# Patient Record
Sex: FEMALE | Race: OTHER | NOT HISPANIC OR LATINO | URBAN - METROPOLITAN AREA
[De-identification: names, ages, dates, MRNs, and addresses within clinical notes are randomized per-mention and may not be internally consistent; named-entity substitution may affect disease eponyms.]

---

## 2017-01-25 ENCOUNTER — OUTPATIENT (OUTPATIENT)
Dept: OUTPATIENT SERVICES | Facility: HOSPITAL | Age: 52
LOS: 1 days | Discharge: HOME | End: 2017-01-25

## 2017-01-25 DIAGNOSIS — C92.11 CHRONIC MYELOID LEUKEMIA, BCR/ABL-POSITIVE, IN REMISSION: ICD-10-CM

## 2017-01-26 ENCOUNTER — APPOINTMENT (OUTPATIENT)
Dept: HEMATOLOGY ONCOLOGY | Facility: CLINIC | Age: 52
End: 2017-01-26

## 2017-01-26 VITALS
HEART RATE: 115 BPM | RESPIRATION RATE: 16 BRPM | OXYGEN SATURATION: 97 % | TEMPERATURE: 98.3 F | BODY MASS INDEX: 23.24 KG/M2 | WEIGHT: 134.48 LBS | SYSTOLIC BLOOD PRESSURE: 140 MMHG | DIASTOLIC BLOOD PRESSURE: 96 MMHG

## 2017-03-01 ENCOUNTER — RX RENEWAL (OUTPATIENT)
Age: 52
End: 2017-03-01

## 2017-04-07 ENCOUNTER — RX RENEWAL (OUTPATIENT)
Age: 52
End: 2017-04-07

## 2017-04-24 ENCOUNTER — OUTPATIENT (OUTPATIENT)
Dept: OUTPATIENT SERVICES | Facility: HOSPITAL | Age: 52
LOS: 1 days | Discharge: HOME | End: 2017-04-24

## 2017-04-24 DIAGNOSIS — C92.11 CHRONIC MYELOID LEUKEMIA, BCR/ABL-POSITIVE, IN REMISSION: ICD-10-CM

## 2017-04-24 DIAGNOSIS — T86.00 UNSPECIFIED COMPLICATION OF BONE MARROW TRANSPLANT: ICD-10-CM

## 2017-04-24 DIAGNOSIS — D89.813 GRAFT-VERSUS-HOST DISEASE, UNSPECIFIED: ICD-10-CM

## 2017-04-27 ENCOUNTER — APPOINTMENT (OUTPATIENT)
Dept: HEMATOLOGY ONCOLOGY | Facility: CLINIC | Age: 52
End: 2017-04-27

## 2017-04-27 VITALS
BODY MASS INDEX: 22.1 KG/M2 | HEART RATE: 116 BPM | WEIGHT: 127.87 LBS | DIASTOLIC BLOOD PRESSURE: 72 MMHG | OXYGEN SATURATION: 96 % | TEMPERATURE: 98 F | SYSTOLIC BLOOD PRESSURE: 122 MMHG | RESPIRATION RATE: 16 BRPM

## 2017-04-27 DIAGNOSIS — R06.2 WHEEZING: ICD-10-CM

## 2017-05-10 ENCOUNTER — RX RENEWAL (OUTPATIENT)
Age: 52
End: 2017-05-10

## 2017-06-19 ENCOUNTER — RX RENEWAL (OUTPATIENT)
Age: 52
End: 2017-06-19

## 2017-07-14 ENCOUNTER — MEDICATION RENEWAL (OUTPATIENT)
Age: 52
End: 2017-07-14

## 2017-07-24 ENCOUNTER — RX RENEWAL (OUTPATIENT)
Age: 52
End: 2017-07-24

## 2017-08-23 ENCOUNTER — CHART COPY (OUTPATIENT)
Age: 52
End: 2017-08-23

## 2017-08-28 ENCOUNTER — OUTPATIENT (OUTPATIENT)
Dept: OUTPATIENT SERVICES | Facility: HOSPITAL | Age: 52
LOS: 1 days | Discharge: HOME | End: 2017-08-28

## 2017-08-28 DIAGNOSIS — C92.11 CHRONIC MYELOID LEUKEMIA, BCR/ABL-POSITIVE, IN REMISSION: ICD-10-CM

## 2017-09-11 ENCOUNTER — APPOINTMENT (OUTPATIENT)
Dept: HEMATOLOGY ONCOLOGY | Facility: CLINIC | Age: 52
End: 2017-09-11
Payer: COMMERCIAL

## 2017-09-21 ENCOUNTER — RESULT REVIEW (OUTPATIENT)
Age: 52
End: 2017-09-21

## 2017-09-21 ENCOUNTER — OUTPATIENT (OUTPATIENT)
Dept: OUTPATIENT SERVICES | Facility: HOSPITAL | Age: 52
LOS: 1 days | End: 2017-09-21
Payer: COMMERCIAL

## 2017-09-21 ENCOUNTER — LABORATORY RESULT (OUTPATIENT)
Age: 52
End: 2017-09-21

## 2017-09-21 ENCOUNTER — APPOINTMENT (OUTPATIENT)
Dept: HEMATOLOGY ONCOLOGY | Facility: CLINIC | Age: 52
End: 2017-09-21
Payer: COMMERCIAL

## 2017-09-21 VITALS
OXYGEN SATURATION: 99 % | HEART RATE: 99 BPM | WEIGHT: 129.63 LBS | TEMPERATURE: 98.8 F | BODY MASS INDEX: 22.97 KG/M2 | DIASTOLIC BLOOD PRESSURE: 86 MMHG | SYSTOLIC BLOOD PRESSURE: 120 MMHG | RESPIRATION RATE: 16 BRPM | HEIGHT: 62.8 IN

## 2017-09-21 DIAGNOSIS — D89.811 CHRONIC GRAFT-VERSUS-HOST DISEASE: ICD-10-CM

## 2017-09-21 LAB
BASOPHILS # BLD AUTO: 0 K/UL — SIGNIFICANT CHANGE UP (ref 0–0.2)
BASOPHILS NFR BLD AUTO: 0.3 % — SIGNIFICANT CHANGE UP (ref 0–2)
EOSINOPHIL # BLD AUTO: 0.1 K/UL — SIGNIFICANT CHANGE UP (ref 0–0.5)
EOSINOPHIL NFR BLD AUTO: 1.1 % — SIGNIFICANT CHANGE UP (ref 0–6)
HCT VFR BLD CALC: 33.6 % — LOW (ref 34.5–45)
HGB BLD-MCNC: 11.4 G/DL — LOW (ref 11.5–15.5)
LYMPHOCYTES # BLD AUTO: 3.2 K/UL — SIGNIFICANT CHANGE UP (ref 1–3.3)
LYMPHOCYTES # BLD AUTO: 52.9 % — HIGH (ref 13–44)
MCHC RBC-ENTMCNC: 31.5 PG — SIGNIFICANT CHANGE UP (ref 27–34)
MCHC RBC-ENTMCNC: 34.1 G/DL — SIGNIFICANT CHANGE UP (ref 32–36)
MCV RBC AUTO: 92.5 FL — SIGNIFICANT CHANGE UP (ref 80–100)
MONOCYTES # BLD AUTO: 0.4 K/UL — SIGNIFICANT CHANGE UP (ref 0–0.9)
MONOCYTES NFR BLD AUTO: 7.3 % — SIGNIFICANT CHANGE UP (ref 2–14)
NEUTROPHILS # BLD AUTO: 2.4 K/UL — SIGNIFICANT CHANGE UP (ref 1.8–7.4)
NEUTROPHILS NFR BLD AUTO: 38.4 % — LOW (ref 43–77)
PLATELET # BLD AUTO: 217 K/UL — SIGNIFICANT CHANGE UP (ref 150–400)
RBC # BLD: 3.63 M/UL — LOW (ref 3.8–5.2)
RBC # FLD: 13.7 % — SIGNIFICANT CHANGE UP (ref 10.3–14.5)
WBC # BLD: 6.1 K/UL — SIGNIFICANT CHANGE UP (ref 3.8–10.5)
WBC # FLD AUTO: 6.1 K/UL — SIGNIFICANT CHANGE UP (ref 3.8–10.5)

## 2017-09-21 PROCEDURE — 81206 BCR/ABL1 GENE MAJOR BP: CPT

## 2017-09-21 PROCEDURE — 88271 CYTOGENETICS DNA PROBE: CPT

## 2017-09-21 PROCEDURE — G0452: CPT | Mod: 26

## 2017-09-21 PROCEDURE — 88291 CYTO/MOLECULAR REPORT: CPT | Mod: 59

## 2017-09-21 PROCEDURE — 88275 CYTOGENETICS 100-300: CPT

## 2017-09-21 PROCEDURE — 99214 OFFICE O/P EST MOD 30 MIN: CPT

## 2017-09-21 PROCEDURE — 88237 TISSUE CULTURE BONE MARROW: CPT

## 2017-09-22 ENCOUNTER — APPOINTMENT (OUTPATIENT)
Dept: HEMATOLOGY ONCOLOGY | Facility: CLINIC | Age: 52
End: 2017-09-22
Payer: COMMERCIAL

## 2017-09-22 VITALS
WEIGHT: 127.87 LBS | DIASTOLIC BLOOD PRESSURE: 85 MMHG | TEMPERATURE: 97.8 F | HEART RATE: 110 BPM | OXYGEN SATURATION: 98 % | BODY MASS INDEX: 22.8 KG/M2 | SYSTOLIC BLOOD PRESSURE: 121 MMHG | RESPIRATION RATE: 16 BRPM

## 2017-09-22 PROCEDURE — 99215 OFFICE O/P EST HI 40 MIN: CPT

## 2017-09-25 LAB — BCR/ABL BY RT - PCR QUANTITATIVE: SIGNIFICANT CHANGE UP

## 2017-09-26 ENCOUNTER — RX RENEWAL (OUTPATIENT)
Age: 52
End: 2017-09-26

## 2017-09-26 LAB — CHROM ANALY INTERPHASE BLD FISH-IMP: SIGNIFICANT CHANGE UP

## 2017-09-27 ENCOUNTER — RX RENEWAL (OUTPATIENT)
Age: 52
End: 2017-09-27

## 2017-09-27 ENCOUNTER — MEDICATION RENEWAL (OUTPATIENT)
Age: 52
End: 2017-09-27

## 2017-09-27 RX ORDER — OXYCODONE HYDROCHLORIDE 30 MG/1
30 TABLET ORAL
Qty: 300 | Refills: 0 | Status: DISCONTINUED | COMMUNITY
Start: 2017-07-24 | End: 2017-09-27

## 2017-09-28 ENCOUNTER — RX RENEWAL (OUTPATIENT)
Age: 52
End: 2017-09-28

## 2017-11-07 ENCOUNTER — RX RENEWAL (OUTPATIENT)
Age: 52
End: 2017-11-07

## 2017-11-15 ENCOUNTER — OUTPATIENT (OUTPATIENT)
Dept: OUTPATIENT SERVICES | Facility: HOSPITAL | Age: 52
LOS: 1 days | Discharge: HOME | End: 2017-11-15

## 2017-11-15 DIAGNOSIS — C92.11 CHRONIC MYELOID LEUKEMIA, BCR/ABL-POSITIVE, IN REMISSION: ICD-10-CM

## 2017-11-20 ENCOUNTER — APPOINTMENT (OUTPATIENT)
Dept: HEMATOLOGY ONCOLOGY | Facility: CLINIC | Age: 52
End: 2017-11-20
Payer: COMMERCIAL

## 2017-11-20 VITALS
DIASTOLIC BLOOD PRESSURE: 70 MMHG | OXYGEN SATURATION: 99 % | HEART RATE: 88 BPM | BODY MASS INDEX: 23.97 KG/M2 | TEMPERATURE: 98 F | RESPIRATION RATE: 16 BRPM | SYSTOLIC BLOOD PRESSURE: 122 MMHG | WEIGHT: 134.48 LBS

## 2017-11-20 PROCEDURE — 99215 OFFICE O/P EST HI 40 MIN: CPT

## 2017-12-12 ENCOUNTER — RX RENEWAL (OUTPATIENT)
Age: 52
End: 2017-12-12

## 2017-12-14 ENCOUNTER — OUTPATIENT (OUTPATIENT)
Dept: OUTPATIENT SERVICES | Facility: HOSPITAL | Age: 52
LOS: 1 days | Discharge: HOME | End: 2017-12-14

## 2017-12-14 DIAGNOSIS — C92.11 CHRONIC MYELOID LEUKEMIA, BCR/ABL-POSITIVE, IN REMISSION: ICD-10-CM

## 2017-12-14 DIAGNOSIS — T86.00 UNSPECIFIED COMPLICATION OF BONE MARROW TRANSPLANT: ICD-10-CM

## 2017-12-14 DIAGNOSIS — D89.813 GRAFT-VERSUS-HOST DISEASE, UNSPECIFIED: ICD-10-CM

## 2017-12-20 ENCOUNTER — OTHER (OUTPATIENT)
Age: 52
End: 2017-12-20

## 2017-12-20 ENCOUNTER — LABORATORY RESULT (OUTPATIENT)
Age: 52
End: 2017-12-20

## 2017-12-20 ENCOUNTER — RESULT REVIEW (OUTPATIENT)
Age: 52
End: 2017-12-20

## 2017-12-20 ENCOUNTER — OUTPATIENT (OUTPATIENT)
Dept: OUTPATIENT SERVICES | Facility: HOSPITAL | Age: 52
LOS: 1 days | End: 2017-12-20
Payer: COMMERCIAL

## 2017-12-20 ENCOUNTER — APPOINTMENT (OUTPATIENT)
Dept: HEMATOLOGY ONCOLOGY | Facility: CLINIC | Age: 52
End: 2017-12-20
Payer: COMMERCIAL

## 2017-12-20 VITALS
RESPIRATION RATE: 17 BRPM | BODY MASS INDEX: 23.97 KG/M2 | OXYGEN SATURATION: 97 % | HEART RATE: 108 BPM | DIASTOLIC BLOOD PRESSURE: 84 MMHG | SYSTOLIC BLOOD PRESSURE: 115 MMHG | WEIGHT: 134.48 LBS | TEMPERATURE: 98.4 F

## 2017-12-20 DIAGNOSIS — C92.11 CHRONIC MYELOID LEUKEMIA, BCR/ABL-POSITIVE, IN REMISSION: ICD-10-CM

## 2017-12-20 LAB
BASOPHILS # BLD AUTO: 0.1 K/UL — SIGNIFICANT CHANGE UP (ref 0–0.2)
BASOPHILS NFR BLD AUTO: 1.1 % — SIGNIFICANT CHANGE UP (ref 0–2)
EOSINOPHIL # BLD AUTO: 0.1 K/UL — SIGNIFICANT CHANGE UP (ref 0–0.5)
EOSINOPHIL NFR BLD AUTO: 0.9 % — SIGNIFICANT CHANGE UP (ref 0–6)
HCT VFR BLD CALC: 34.2 % — LOW (ref 34.5–45)
HGB BLD-MCNC: 11.8 G/DL — SIGNIFICANT CHANGE UP (ref 11.5–15.5)
LYMPHOCYTES # BLD AUTO: 2.9 K/UL — SIGNIFICANT CHANGE UP (ref 1–3.3)
LYMPHOCYTES # BLD AUTO: 49.3 % — HIGH (ref 13–44)
MCHC RBC-ENTMCNC: 32.2 PG — SIGNIFICANT CHANGE UP (ref 27–34)
MCHC RBC-ENTMCNC: 34.6 G/DL — SIGNIFICANT CHANGE UP (ref 32–36)
MCV RBC AUTO: 93.1 FL — SIGNIFICANT CHANGE UP (ref 80–100)
MONOCYTES # BLD AUTO: 0.6 K/UL — SIGNIFICANT CHANGE UP (ref 0–0.9)
MONOCYTES NFR BLD AUTO: 9.5 % — SIGNIFICANT CHANGE UP (ref 2–14)
NEUTROPHILS # BLD AUTO: 2.3 K/UL — SIGNIFICANT CHANGE UP (ref 1.8–7.4)
NEUTROPHILS NFR BLD AUTO: 39.2 % — LOW (ref 43–77)
PLATELET # BLD AUTO: 206 K/UL — SIGNIFICANT CHANGE UP (ref 150–400)
RBC # BLD: 3.67 M/UL — LOW (ref 3.8–5.2)
RBC # FLD: 11.4 % — SIGNIFICANT CHANGE UP (ref 10.3–14.5)
WBC # BLD: 5.9 K/UL — SIGNIFICANT CHANGE UP (ref 3.8–10.5)
WBC # FLD AUTO: 5.9 K/UL — SIGNIFICANT CHANGE UP (ref 3.8–10.5)

## 2017-12-20 PROCEDURE — 88271 CYTOGENETICS DNA PROBE: CPT

## 2017-12-20 PROCEDURE — 88275 CYTOGENETICS 100-300: CPT

## 2017-12-20 PROCEDURE — 88291 CYTO/MOLECULAR REPORT: CPT | Mod: 59

## 2017-12-20 PROCEDURE — 81206 BCR/ABL1 GENE MAJOR BP: CPT

## 2017-12-20 PROCEDURE — 99214 OFFICE O/P EST MOD 30 MIN: CPT

## 2017-12-20 PROCEDURE — 88237 TISSUE CULTURE BONE MARROW: CPT

## 2017-12-20 PROCEDURE — G0452: CPT | Mod: 26

## 2017-12-26 LAB — CHROM ANALY INTERPHASE BLD FISH-IMP: SIGNIFICANT CHANGE UP

## 2017-12-28 LAB — BCR/ABL BY RT - PCR QUANTITATIVE: SIGNIFICANT CHANGE UP

## 2018-01-03 ENCOUNTER — OTHER (OUTPATIENT)
Age: 53
End: 2018-01-03

## 2018-01-10 ENCOUNTER — APPOINTMENT (OUTPATIENT)
Dept: HEMATOLOGY ONCOLOGY | Facility: CLINIC | Age: 53
End: 2018-01-10

## 2018-01-17 ENCOUNTER — OUTPATIENT (OUTPATIENT)
Dept: OUTPATIENT SERVICES | Facility: HOSPITAL | Age: 53
LOS: 1 days | Discharge: HOME | End: 2018-01-17

## 2018-01-17 DIAGNOSIS — C92.11 CHRONIC MYELOID LEUKEMIA, BCR/ABL-POSITIVE, IN REMISSION: ICD-10-CM

## 2018-01-17 DIAGNOSIS — T86.00 UNSPECIFIED COMPLICATION OF BONE MARROW TRANSPLANT: ICD-10-CM

## 2018-01-17 DIAGNOSIS — D89.813 GRAFT-VERSUS-HOST DISEASE, UNSPECIFIED: ICD-10-CM

## 2018-01-18 ENCOUNTER — RESULT REVIEW (OUTPATIENT)
Age: 53
End: 2018-01-18

## 2018-01-18 ENCOUNTER — APPOINTMENT (OUTPATIENT)
Dept: HEMATOLOGY ONCOLOGY | Facility: CLINIC | Age: 53
End: 2018-01-18

## 2018-01-18 ENCOUNTER — OTHER (OUTPATIENT)
Age: 53
End: 2018-01-18

## 2018-01-18 LAB
BASOPHILS # BLD AUTO: 0 K/UL — SIGNIFICANT CHANGE UP (ref 0–0.2)
BASOPHILS NFR BLD AUTO: 0.7 % — SIGNIFICANT CHANGE UP (ref 0–2)
EOSINOPHIL # BLD AUTO: 0.1 K/UL — SIGNIFICANT CHANGE UP (ref 0–0.5)
EOSINOPHIL NFR BLD AUTO: 0.9 % — SIGNIFICANT CHANGE UP (ref 0–6)
HCT VFR BLD CALC: 33.1 % — LOW (ref 34.5–45)
HGB BLD-MCNC: 11.5 G/DL — SIGNIFICANT CHANGE UP (ref 11.5–15.5)
LYMPHOCYTES # BLD AUTO: 3.3 K/UL — SIGNIFICANT CHANGE UP (ref 1–3.3)
LYMPHOCYTES # BLD AUTO: 52.8 % — HIGH (ref 13–44)
MCHC RBC-ENTMCNC: 31.9 PG — SIGNIFICANT CHANGE UP (ref 27–34)
MCHC RBC-ENTMCNC: 34.8 G/DL — SIGNIFICANT CHANGE UP (ref 32–36)
MCV RBC AUTO: 91.6 FL — SIGNIFICANT CHANGE UP (ref 80–100)
MONOCYTES # BLD AUTO: 0.5 K/UL — SIGNIFICANT CHANGE UP (ref 0–0.9)
MONOCYTES NFR BLD AUTO: 7.4 % — SIGNIFICANT CHANGE UP (ref 2–14)
NEUTROPHILS # BLD AUTO: 2.4 K/UL — SIGNIFICANT CHANGE UP (ref 1.8–7.4)
NEUTROPHILS NFR BLD AUTO: 38.2 % — LOW (ref 43–77)
PLATELET # BLD AUTO: 188 K/UL — SIGNIFICANT CHANGE UP (ref 150–400)
RBC # BLD: 3.62 M/UL — LOW (ref 3.8–5.2)
RBC # FLD: 11.5 % — SIGNIFICANT CHANGE UP (ref 10.3–14.5)
WBC # BLD: 6.3 K/UL — SIGNIFICANT CHANGE UP (ref 3.8–10.5)
WBC # FLD AUTO: 6.3 K/UL — SIGNIFICANT CHANGE UP (ref 3.8–10.5)

## 2018-01-19 LAB
ALBUMIN SERPL ELPH-MCNC: 4.4 G/DL
ALP BLD-CCNC: 149 U/L
ALT SERPL-CCNC: 69 U/L
ANION GAP SERPL CALC-SCNC: 17 MMOL/L
AST SERPL-CCNC: 59 U/L
BILIRUB SERPL-MCNC: 0.2 MG/DL
BUN SERPL-MCNC: 9 MG/DL
CALCIUM SERPL-MCNC: 10.1 MG/DL
CHLORIDE SERPL-SCNC: 99 MMOL/L
CO2 SERPL-SCNC: 25 MMOL/L
CREAT SERPL-MCNC: 0.79 MG/DL
GLUCOSE SERPL-MCNC: 296 MG/DL
LDH SERPL-CCNC: 253 U/L
MAGNESIUM SERPL-MCNC: 1.7 MG/DL
POTASSIUM SERPL-SCNC: 4.5 MMOL/L
PROT SERPL-MCNC: 7.5 G/DL
SODIUM SERPL-SCNC: 141 MMOL/L

## 2018-02-01 ENCOUNTER — RX RENEWAL (OUTPATIENT)
Age: 53
End: 2018-02-01

## 2018-02-23 ENCOUNTER — OUTPATIENT (OUTPATIENT)
Dept: OUTPATIENT SERVICES | Facility: HOSPITAL | Age: 53
LOS: 1 days | Discharge: HOME | End: 2018-02-23

## 2018-02-23 DIAGNOSIS — D89.813 GRAFT-VERSUS-HOST DISEASE, UNSPECIFIED: ICD-10-CM

## 2018-02-23 DIAGNOSIS — T86.00 UNSPECIFIED COMPLICATION OF BONE MARROW TRANSPLANT: ICD-10-CM

## 2018-02-23 DIAGNOSIS — C92.11 CHRONIC MYELOID LEUKEMIA, BCR/ABL-POSITIVE, IN REMISSION: ICD-10-CM

## 2018-02-27 ENCOUNTER — RX RENEWAL (OUTPATIENT)
Age: 53
End: 2018-02-27

## 2018-03-01 ENCOUNTER — APPOINTMENT (OUTPATIENT)
Dept: HEMATOLOGY ONCOLOGY | Facility: CLINIC | Age: 53
End: 2018-03-01
Payer: COMMERCIAL

## 2018-03-01 VITALS
TEMPERATURE: 97.8 F | DIASTOLIC BLOOD PRESSURE: 128 MMHG | RESPIRATION RATE: 16 BRPM | HEART RATE: 101 BPM | SYSTOLIC BLOOD PRESSURE: 168 MMHG | BODY MASS INDEX: 23.31 KG/M2 | WEIGHT: 130.73 LBS | OXYGEN SATURATION: 99 %

## 2018-03-01 PROCEDURE — 99214 OFFICE O/P EST MOD 30 MIN: CPT

## 2018-04-02 ENCOUNTER — OUTPATIENT (OUTPATIENT)
Dept: OUTPATIENT SERVICES | Facility: HOSPITAL | Age: 53
LOS: 1 days | Discharge: HOME | End: 2018-04-02

## 2018-04-02 DIAGNOSIS — C92.11 CHRONIC MYELOID LEUKEMIA, BCR/ABL-POSITIVE, IN REMISSION: ICD-10-CM

## 2018-04-04 ENCOUNTER — RESULT REVIEW (OUTPATIENT)
Age: 53
End: 2018-04-04

## 2018-04-04 ENCOUNTER — OUTPATIENT (OUTPATIENT)
Dept: OUTPATIENT SERVICES | Facility: HOSPITAL | Age: 53
LOS: 1 days | End: 2018-04-04
Payer: COMMERCIAL

## 2018-04-04 ENCOUNTER — APPOINTMENT (OUTPATIENT)
Dept: HEMATOLOGY ONCOLOGY | Facility: CLINIC | Age: 53
End: 2018-04-04
Payer: COMMERCIAL

## 2018-04-04 VITALS
BODY MASS INDEX: 23.27 KG/M2 | TEMPERATURE: 98.5 F | DIASTOLIC BLOOD PRESSURE: 74 MMHG | OXYGEN SATURATION: 99 % | RESPIRATION RATE: 16 BRPM | WEIGHT: 130.51 LBS | SYSTOLIC BLOOD PRESSURE: 140 MMHG | HEART RATE: 98 BPM

## 2018-04-04 DIAGNOSIS — C92.11 CHRONIC MYELOID LEUKEMIA, BCR/ABL-POSITIVE, IN REMISSION: ICD-10-CM

## 2018-04-04 LAB
BASOPHILS # BLD AUTO: 0 K/UL — SIGNIFICANT CHANGE UP (ref 0–0.2)
BASOPHILS NFR BLD AUTO: 0.4 % — SIGNIFICANT CHANGE UP (ref 0–2)
EOSINOPHIL # BLD AUTO: 0 K/UL — SIGNIFICANT CHANGE UP (ref 0–0.5)
EOSINOPHIL NFR BLD AUTO: 0.9 % — SIGNIFICANT CHANGE UP (ref 0–6)
HCT VFR BLD CALC: 31.7 % — LOW (ref 34.5–45)
HGB BLD-MCNC: 11.5 G/DL — SIGNIFICANT CHANGE UP (ref 11.5–15.5)
LYMPHOCYTES # BLD AUTO: 2.8 K/UL — SIGNIFICANT CHANGE UP (ref 1–3.3)
LYMPHOCYTES # BLD AUTO: 51 % — HIGH (ref 13–44)
MCHC RBC-ENTMCNC: 33 PG — SIGNIFICANT CHANGE UP (ref 27–34)
MCHC RBC-ENTMCNC: 36.2 G/DL — HIGH (ref 32–36)
MCV RBC AUTO: 91.2 FL — SIGNIFICANT CHANGE UP (ref 80–100)
MONOCYTES # BLD AUTO: 0.4 K/UL — SIGNIFICANT CHANGE UP (ref 0–0.9)
MONOCYTES NFR BLD AUTO: 7.8 % — SIGNIFICANT CHANGE UP (ref 2–14)
NEUTROPHILS # BLD AUTO: 2.2 K/UL — SIGNIFICANT CHANGE UP (ref 1.8–7.4)
NEUTROPHILS NFR BLD AUTO: 39.9 % — LOW (ref 43–77)
PLATELET # BLD AUTO: 162 K/UL — SIGNIFICANT CHANGE UP (ref 150–400)
RBC # BLD: 3.47 M/UL — LOW (ref 3.8–5.2)
RBC # FLD: 12 % — SIGNIFICANT CHANGE UP (ref 10.3–14.5)
WBC # BLD: 5.4 K/UL — SIGNIFICANT CHANGE UP (ref 3.8–10.5)
WBC # FLD AUTO: 5.4 K/UL — SIGNIFICANT CHANGE UP (ref 3.8–10.5)

## 2018-04-04 PROCEDURE — G0452: CPT | Mod: 26

## 2018-04-04 PROCEDURE — 81206 BCR/ABL1 GENE MAJOR BP: CPT

## 2018-04-04 PROCEDURE — 88271 CYTOGENETICS DNA PROBE: CPT

## 2018-04-04 PROCEDURE — 88291 CYTO/MOLECULAR REPORT: CPT | Mod: 59

## 2018-04-04 PROCEDURE — 88275 CYTOGENETICS 100-300: CPT

## 2018-04-04 PROCEDURE — 99215 OFFICE O/P EST HI 40 MIN: CPT

## 2018-04-04 PROCEDURE — 88237 TISSUE CULTURE BONE MARROW: CPT

## 2018-04-04 PROCEDURE — 81207 BCR/ABL1 GENE MINOR BP: CPT

## 2018-04-05 LAB
ALBUMIN SERPL ELPH-MCNC: 4.3 G/DL
ALP BLD-CCNC: 184 U/L
ALT SERPL-CCNC: 112 U/L
ANION GAP SERPL CALC-SCNC: 13 MMOL/L
AST SERPL-CCNC: 86 U/L
BILIRUB SERPL-MCNC: 0.2 MG/DL
BUN SERPL-MCNC: 8 MG/DL
CALCIUM SERPL-MCNC: 9.9 MG/DL
CHLORIDE SERPL-SCNC: 100 MMOL/L
CO2 SERPL-SCNC: 26 MMOL/L
CREAT SERPL-MCNC: 0.82 MG/DL
DEPRECATED KAPPA LC FREE/LAMBDA SER: 1.01 RATIO
FERRITIN SERPL-MCNC: 2871 NG/ML
GLUCOSE SERPL-MCNC: 419 MG/DL
IGA SER QL IEP: 297 MG/DL
IGG SER QL IEP: 1090 MG/DL
IGM SER QL IEP: 196 MG/DL
KAPPA LC CSF-MCNC: 1.41 MG/DL
KAPPA LC SERPL-MCNC: 1.42 MG/DL
LDH SERPL-CCNC: 255 U/L
MAGNESIUM SERPL-MCNC: 1.7 MG/DL
POTASSIUM SERPL-SCNC: 4.7 MMOL/L
PROT SERPL-MCNC: 7.3 G/DL
SODIUM SERPL-SCNC: 139 MMOL/L

## 2018-04-06 LAB — BCR/ABL BY RT - PCR QUANTITATIVE: SIGNIFICANT CHANGE UP

## 2018-04-09 LAB — CHROM ANALY INTERPHASE BLD FISH-IMP: SIGNIFICANT CHANGE UP

## 2018-04-10 ENCOUNTER — RX RENEWAL (OUTPATIENT)
Age: 53
End: 2018-04-10

## 2018-04-12 ENCOUNTER — APPOINTMENT (OUTPATIENT)
Dept: HEMATOLOGY ONCOLOGY | Facility: CLINIC | Age: 53
End: 2018-04-12

## 2018-04-20 ENCOUNTER — RESULT REVIEW (OUTPATIENT)
Age: 53
End: 2018-04-20

## 2018-04-20 ENCOUNTER — APPOINTMENT (OUTPATIENT)
Dept: INFUSION THERAPY | Facility: HOSPITAL | Age: 53
End: 2018-04-20

## 2018-04-20 LAB
BASOPHILS # BLD AUTO: 0.1 K/UL — SIGNIFICANT CHANGE UP (ref 0–0.2)
BASOPHILS NFR BLD AUTO: 0.8 % — SIGNIFICANT CHANGE UP (ref 0–2)
EOSINOPHIL # BLD AUTO: 0.1 K/UL — SIGNIFICANT CHANGE UP (ref 0–0.5)
EOSINOPHIL NFR BLD AUTO: 1.5 % — SIGNIFICANT CHANGE UP (ref 0–6)
HCT VFR BLD CALC: 33.4 % — LOW (ref 34.5–45)
HGB BLD-MCNC: 11.8 G/DL — SIGNIFICANT CHANGE UP (ref 11.5–15.5)
LYMPHOCYTES # BLD AUTO: 4.2 K/UL — HIGH (ref 1–3.3)
LYMPHOCYTES # BLD AUTO: 57.8 % — HIGH (ref 13–44)
MCHC RBC-ENTMCNC: 32.4 PG — SIGNIFICANT CHANGE UP (ref 27–34)
MCHC RBC-ENTMCNC: 35.2 G/DL — SIGNIFICANT CHANGE UP (ref 32–36)
MCV RBC AUTO: 92 FL — SIGNIFICANT CHANGE UP (ref 80–100)
MONOCYTES # BLD AUTO: 0.6 K/UL — SIGNIFICANT CHANGE UP (ref 0–0.9)
MONOCYTES NFR BLD AUTO: 8.7 % — SIGNIFICANT CHANGE UP (ref 2–14)
NEUTROPHILS # BLD AUTO: 2.2 K/UL — SIGNIFICANT CHANGE UP (ref 1.8–7.4)
NEUTROPHILS NFR BLD AUTO: 31.3 % — LOW (ref 43–77)
PLATELET # BLD AUTO: 182 K/UL — SIGNIFICANT CHANGE UP (ref 150–400)
RBC # BLD: 3.63 M/UL — LOW (ref 3.8–5.2)
RBC # FLD: 11.7 % — SIGNIFICANT CHANGE UP (ref 10.3–14.5)
WBC # BLD: 7.2 K/UL — SIGNIFICANT CHANGE UP (ref 3.8–10.5)
WBC # FLD AUTO: 7.2 K/UL — SIGNIFICANT CHANGE UP (ref 3.8–10.5)

## 2018-04-23 DIAGNOSIS — R11.2 NAUSEA WITH VOMITING, UNSPECIFIED: ICD-10-CM

## 2018-04-23 DIAGNOSIS — Z51.11 ENCOUNTER FOR ANTINEOPLASTIC CHEMOTHERAPY: ICD-10-CM

## 2018-04-25 ENCOUNTER — RX RENEWAL (OUTPATIENT)
Age: 53
End: 2018-04-25

## 2018-04-27 ENCOUNTER — RESULT REVIEW (OUTPATIENT)
Age: 53
End: 2018-04-27

## 2018-04-27 ENCOUNTER — APPOINTMENT (OUTPATIENT)
Dept: INFUSION THERAPY | Facility: HOSPITAL | Age: 53
End: 2018-04-27

## 2018-04-27 LAB
BASOPHILS # BLD AUTO: 0 K/UL — SIGNIFICANT CHANGE UP (ref 0–0.2)
BASOPHILS NFR BLD AUTO: 0.5 % — SIGNIFICANT CHANGE UP (ref 0–2)
EOSINOPHIL # BLD AUTO: 0.1 K/UL — SIGNIFICANT CHANGE UP (ref 0–0.5)
EOSINOPHIL NFR BLD AUTO: 2.2 % — SIGNIFICANT CHANGE UP (ref 0–6)
HCT VFR BLD CALC: 32 % — LOW (ref 34.5–45)
HGB BLD-MCNC: 11.7 G/DL — SIGNIFICANT CHANGE UP (ref 11.5–15.5)
LYMPHOCYTES # BLD AUTO: 2.4 K/UL — SIGNIFICANT CHANGE UP (ref 1–3.3)
LYMPHOCYTES # BLD AUTO: 44.2 % — HIGH (ref 13–44)
MCHC RBC-ENTMCNC: 32.9 PG — SIGNIFICANT CHANGE UP (ref 27–34)
MCHC RBC-ENTMCNC: 36.4 G/DL — HIGH (ref 32–36)
MCV RBC AUTO: 90.4 FL — SIGNIFICANT CHANGE UP (ref 80–100)
MONOCYTES # BLD AUTO: 0.6 K/UL — SIGNIFICANT CHANGE UP (ref 0–0.9)
MONOCYTES NFR BLD AUTO: 10.3 % — SIGNIFICANT CHANGE UP (ref 2–14)
NEUTROPHILS # BLD AUTO: 2.3 K/UL — SIGNIFICANT CHANGE UP (ref 1.8–7.4)
NEUTROPHILS NFR BLD AUTO: 42.8 % — LOW (ref 43–77)
PLATELET # BLD AUTO: 150 K/UL — SIGNIFICANT CHANGE UP (ref 150–400)
RBC # BLD: 3.54 M/UL — LOW (ref 3.8–5.2)
RBC # FLD: 11.6 % — SIGNIFICANT CHANGE UP (ref 10.3–14.5)
WBC # BLD: 5.3 K/UL — SIGNIFICANT CHANGE UP (ref 3.8–10.5)
WBC # FLD AUTO: 5.3 K/UL — SIGNIFICANT CHANGE UP (ref 3.8–10.5)

## 2018-06-06 ENCOUNTER — RX RENEWAL (OUTPATIENT)
Age: 53
End: 2018-06-06

## 2018-07-20 ENCOUNTER — RX RENEWAL (OUTPATIENT)
Age: 53
End: 2018-07-20

## 2018-08-15 ENCOUNTER — RX RENEWAL (OUTPATIENT)
Age: 53
End: 2018-08-15

## 2018-08-22 ENCOUNTER — RX RENEWAL (OUTPATIENT)
Age: 53
End: 2018-08-22

## 2018-09-13 ENCOUNTER — OUTPATIENT (OUTPATIENT)
Dept: OUTPATIENT SERVICES | Facility: HOSPITAL | Age: 53
LOS: 1 days | Discharge: HOME | End: 2018-09-13

## 2018-09-13 DIAGNOSIS — C92.11 CHRONIC MYELOID LEUKEMIA, BCR/ABL-POSITIVE, IN REMISSION: ICD-10-CM

## 2018-09-14 ENCOUNTER — APPOINTMENT (OUTPATIENT)
Dept: HEMATOLOGY ONCOLOGY | Facility: CLINIC | Age: 53
End: 2018-09-14
Payer: MEDICARE

## 2018-09-14 VITALS
RESPIRATION RATE: 16 BRPM | WEIGHT: 132.06 LBS | SYSTOLIC BLOOD PRESSURE: 126 MMHG | DIASTOLIC BLOOD PRESSURE: 86 MMHG | OXYGEN SATURATION: 98 % | TEMPERATURE: 98 F | HEART RATE: 106 BPM | BODY MASS INDEX: 23.54 KG/M2

## 2018-09-14 PROCEDURE — 99214 OFFICE O/P EST MOD 30 MIN: CPT

## 2018-11-02 ENCOUNTER — RX RENEWAL (OUTPATIENT)
Age: 53
End: 2018-11-02

## 2018-11-15 ENCOUNTER — RX RENEWAL (OUTPATIENT)
Age: 53
End: 2018-11-15

## 2018-11-21 ENCOUNTER — RX RENEWAL (OUTPATIENT)
Age: 53
End: 2018-11-21

## 2018-12-05 ENCOUNTER — RX RENEWAL (OUTPATIENT)
Age: 53
End: 2018-12-05

## 2018-12-11 ENCOUNTER — OUTPATIENT (OUTPATIENT)
Dept: OUTPATIENT SERVICES | Facility: HOSPITAL | Age: 53
LOS: 1 days | Discharge: HOME | End: 2018-12-11

## 2018-12-11 DIAGNOSIS — D89.813 GRAFT-VERSUS-HOST DISEASE, UNSPECIFIED: ICD-10-CM

## 2018-12-11 DIAGNOSIS — C92.11 CHRONIC MYELOID LEUKEMIA, BCR/ABL-POSITIVE, IN REMISSION: ICD-10-CM

## 2018-12-17 ENCOUNTER — APPOINTMENT (OUTPATIENT)
Dept: HEMATOLOGY ONCOLOGY | Facility: CLINIC | Age: 53
End: 2018-12-17
Payer: MEDICARE

## 2018-12-17 VITALS
WEIGHT: 130.73 LBS | BODY MASS INDEX: 23.31 KG/M2 | DIASTOLIC BLOOD PRESSURE: 93 MMHG | SYSTOLIC BLOOD PRESSURE: 128 MMHG | TEMPERATURE: 98 F | OXYGEN SATURATION: 96 % | RESPIRATION RATE: 16 BRPM | HEART RATE: 96 BPM

## 2018-12-17 PROCEDURE — 99214 OFFICE O/P EST MOD 30 MIN: CPT

## 2018-12-20 NOTE — PHYSICAL EXAM
[Restricted in physically strenuous activity but ambulatory and able to carry out work of a light or sedentary nature] : Status 1- Restricted in physically strenuous activity but ambulatory and able to carry out work of a light or sedentary nature, e.g., light house work, office work [Normal] : affect appropriate [de-identified] : end exp wheezing bilaterally

## 2018-12-20 NOTE — ASSESSMENT
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 [place].No issues.RTC 3 months.I doubt she can wean off of her current opioids at this time,  [Supportive] : Goals of care discussed with patient: Supportive

## 2018-12-20 NOTE — HISTORY OF PRESENT ILLNESS
[de-identified] : 51-year-old  woman with a history of CML status post  bone marrow transplant in 1999. She has been in remission and is considered cured of her CML. 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 We see her periodically for medication management as she is reasonably well controlled with her current regimen.this includes gabapentin, methadone, and oxycodone on a p.r.n. basis.\par In February 2016, she tripped and fell over a pothole. She denies any trauma to the head, but describes almost a whip-lash injury. A few days later she started to have mild cognitive changes including word-finding, coordination and balance difficulties consistent with post-concussive syndrome. She also noticed that her regular neuropathic pain s/p Rituxan with 5/10 intensity became significantly increased to 8/10, especially her right-sided tightness, creepy-crawly sensation along both thighs, and burning lancinating pain in bilateral feet. By the end of the week, she suffered a significant headache with worsening overall pain, requiring admission to [facility]. Patient was found to have urgent hypertension and new-onset of PM seizures, the etiology of which has not been determined. The patient does have a Hx of CNS infarct while on MMF, CSA and  as per Dr Bolaños.\par \par Since 2/2016, the pain exacerbation has improved, but has been replaced by sub-occipital to coccygeal cold twisting knife-like tension along her spine that intermittently happens 1-2 times weekly. Massage therapy in July 2016 relaxed muscle spasms on low-right cervical muscles and upper trapezius, but also caused cracking. Since that time, right neck spasm improved.\par She is being followed for a history of status epilepticus and a very atypical seizure disorder at [facility] and was hospitalized there in 2015 on several occasions.\par She continues to see Dr Bolaños in follow up and remains on antibiotic prophylaxis.\par  [de-identified] : Only new c/o is burning feeling of toes, rosa tip of great toe.Opioid use pattern..stable for years:6 am, 2PM, 630..9PM.Functional and w no misuse iossues since treatment inception.

## 2019-01-16 ENCOUNTER — RX RENEWAL (OUTPATIENT)
Age: 54
End: 2019-01-16

## 2019-02-19 ENCOUNTER — RX RENEWAL (OUTPATIENT)
Age: 54
End: 2019-02-19

## 2019-03-07 ENCOUNTER — OUTPATIENT (OUTPATIENT)
Dept: OUTPATIENT SERVICES | Facility: HOSPITAL | Age: 54
LOS: 1 days | Discharge: HOME | End: 2019-03-07

## 2019-03-07 DIAGNOSIS — T86.00 UNSPECIFIED COMPLICATION OF BONE MARROW TRANSPLANT: ICD-10-CM

## 2019-03-07 DIAGNOSIS — D89.813 GRAFT-VERSUS-HOST DISEASE, UNSPECIFIED: ICD-10-CM

## 2019-03-07 DIAGNOSIS — C92.11 CHRONIC MYELOID LEUKEMIA, BCR/ABL-POSITIVE, IN REMISSION: ICD-10-CM

## 2019-03-14 ENCOUNTER — APPOINTMENT (OUTPATIENT)
Dept: HEMATOLOGY ONCOLOGY | Facility: CLINIC | Age: 54
End: 2019-03-14
Payer: COMMERCIAL

## 2019-03-14 VITALS
SYSTOLIC BLOOD PRESSURE: 123 MMHG | BODY MASS INDEX: 22.6 KG/M2 | WEIGHT: 126.77 LBS | RESPIRATION RATE: 22 BRPM | HEART RATE: 119 BPM | DIASTOLIC BLOOD PRESSURE: 80 MMHG | TEMPERATURE: 98.2 F | OXYGEN SATURATION: 97 %

## 2019-03-14 PROCEDURE — 99214 OFFICE O/P EST MOD 30 MIN: CPT

## 2019-03-17 NOTE — HISTORY OF PRESENT ILLNESS
[de-identified] : 51-year-old  woman with a history of CML status post  bone marrow transplant in 1999. She has been in remission and is considered cured of her CML. 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 We see her periodically for medication management as she is reasonably well controlled with her current regimen.this includes gabapentin, methadone, and oxycodone on a p.r.n. basis.\par In February 2016, she tripped and fell over a pothole. She denies any trauma to the head, but describes almost a whip-lash injury. A few days later she started to have mild cognitive changes including word-finding, coordination and balance difficulties consistent with post-concussive syndrome. She also noticed that her regular neuropathic pain s/p Rituxan with 5/10 intensity became significantly increased to 8/10, especially her right-sided tightness, creepy-crawly sensation along both thighs, and burning lancinating pain in bilateral feet. By the end of the week, she suffered a significant headache with worsening overall pain, requiring admission to [facility]. Patient was found to have urgent hypertension and new-onset of PM seizures, the etiology of which has not been determined. The patient does have a Hx of CNS infarct while on MMF, CSA and  as per Dr Bolaños.\par \par Since 2/2016, the pain exacerbation has improved, but has been replaced by sub-occipital to coccygeal cold twisting knife-like tension along her spine that intermittently happens 1-2 times weekly. Massage therapy in July 2016 relaxed muscle spasms on low-right cervical muscles and upper trapezius, but also caused cracking. Since that time, right neck spasm improved.\par She is being followed for a history of status epilepticus and a very atypical seizure disorder at [facility] and was hospitalized there in 2015 on several occasions.\par She continues to see Dr Bolaños in follow up and remains on antibiotic prophylaxis.\par  [de-identified] : No new c/o<Here for structured visit..Opioid use pattern..stable for years:6 am, 2PM, 630..9PM.Functional and w no misuse iissues since treatment inception.

## 2019-03-17 NOTE — ASSESSMENT
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 [place].No issues.RTC 3 months.I doubt she can wean off of her current opioids at this time,

## 2019-03-17 NOTE — PHYSICAL EXAM
[Restricted in physically strenuous activity but ambulatory and able to carry out work of a light or sedentary nature] : Status 1- Restricted in physically strenuous activity but ambulatory and able to carry out work of a light or sedentary nature, e.g., light house work, office work [de-identified] : end exp wheezing bilaterally [Normal] : affect appropriate

## 2019-04-06 ENCOUNTER — RX RENEWAL (OUTPATIENT)
Age: 54
End: 2019-04-06

## 2019-04-25 ENCOUNTER — RX RENEWAL (OUTPATIENT)
Age: 54
End: 2019-04-25

## 2019-05-28 ENCOUNTER — RX RENEWAL (OUTPATIENT)
Age: 54
End: 2019-05-28

## 2019-06-10 ENCOUNTER — OUTPATIENT (OUTPATIENT)
Dept: OUTPATIENT SERVICES | Facility: HOSPITAL | Age: 54
LOS: 1 days | Discharge: HOME | End: 2019-06-10

## 2019-06-10 DIAGNOSIS — D64.9 ANEMIA, UNSPECIFIED: ICD-10-CM

## 2019-06-10 DIAGNOSIS — T86.00 UNSPECIFIED COMPLICATION OF BONE MARROW TRANSPLANT: ICD-10-CM

## 2019-06-10 DIAGNOSIS — C92.11 CHRONIC MYELOID LEUKEMIA, BCR/ABL-POSITIVE, IN REMISSION: ICD-10-CM

## 2019-06-13 ENCOUNTER — APPOINTMENT (OUTPATIENT)
Dept: HEMATOLOGY ONCOLOGY | Facility: CLINIC | Age: 54
End: 2019-06-13

## 2019-07-01 ENCOUNTER — RX RENEWAL (OUTPATIENT)
Age: 54
End: 2019-07-01

## 2019-07-10 ENCOUNTER — OUTPATIENT (OUTPATIENT)
Dept: OUTPATIENT SERVICES | Facility: HOSPITAL | Age: 54
LOS: 1 days | Discharge: HOME | End: 2019-07-10

## 2019-07-10 DIAGNOSIS — D89.813 GRAFT-VERSUS-HOST DISEASE, UNSPECIFIED: ICD-10-CM

## 2019-07-10 DIAGNOSIS — C92.11 CHRONIC MYELOID LEUKEMIA, BCR/ABL-POSITIVE, IN REMISSION: ICD-10-CM

## 2019-07-10 DIAGNOSIS — T86.00 UNSPECIFIED COMPLICATION OF BONE MARROW TRANSPLANT: ICD-10-CM

## 2019-07-12 ENCOUNTER — APPOINTMENT (OUTPATIENT)
Dept: HEMATOLOGY ONCOLOGY | Facility: CLINIC | Age: 54
End: 2019-07-12
Payer: COMMERCIAL

## 2019-07-12 VITALS
BODY MASS INDEX: 23.31 KG/M2 | WEIGHT: 130.73 LBS | HEART RATE: 101 BPM | SYSTOLIC BLOOD PRESSURE: 144 MMHG | RESPIRATION RATE: 16 BRPM | TEMPERATURE: 95.7 F | OXYGEN SATURATION: 99 % | DIASTOLIC BLOOD PRESSURE: 94 MMHG

## 2019-07-12 PROCEDURE — 99214 OFFICE O/P EST MOD 30 MIN: CPT

## 2019-07-15 NOTE — PHYSICAL EXAM
[Restricted in physically strenuous activity but ambulatory and able to carry out work of a light or sedentary nature] : Status 1- Restricted in physically strenuous activity but ambulatory and able to carry out work of a light or sedentary nature, e.g., light house work, office work [Normal] : affect appropriate [de-identified] : end exp wheezing bilaterally
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
## 2019-07-25 ENCOUNTER — RX RENEWAL (OUTPATIENT)
Age: 54
End: 2019-07-25

## 2019-08-22 ENCOUNTER — RX RENEWAL (OUTPATIENT)
Age: 54
End: 2019-08-22

## 2019-09-24 ENCOUNTER — RX RENEWAL (OUTPATIENT)
Age: 54
End: 2019-09-24

## 2019-10-15 ENCOUNTER — OUTPATIENT (OUTPATIENT)
Dept: OUTPATIENT SERVICES | Facility: HOSPITAL | Age: 54
LOS: 1 days | Discharge: HOME | End: 2019-10-15

## 2019-10-15 DIAGNOSIS — D89.813 GRAFT-VERSUS-HOST DISEASE, UNSPECIFIED: ICD-10-CM

## 2019-10-15 DIAGNOSIS — C92.11 CHRONIC MYELOID LEUKEMIA, BCR/ABL-POSITIVE, IN REMISSION: ICD-10-CM

## 2019-10-15 DIAGNOSIS — T86.00 UNSPECIFIED COMPLICATION OF BONE MARROW TRANSPLANT: ICD-10-CM

## 2019-10-17 ENCOUNTER — RESULT REVIEW (OUTPATIENT)
Age: 54
End: 2019-10-17

## 2019-10-17 ENCOUNTER — APPOINTMENT (OUTPATIENT)
Dept: HEMATOLOGY ONCOLOGY | Facility: CLINIC | Age: 54
End: 2019-10-17
Payer: COMMERCIAL

## 2019-10-17 ENCOUNTER — OUTPATIENT (OUTPATIENT)
Dept: OUTPATIENT SERVICES | Facility: HOSPITAL | Age: 54
LOS: 1 days | End: 2019-10-17
Payer: COMMERCIAL

## 2019-10-17 VITALS
RESPIRATION RATE: 16 BRPM | DIASTOLIC BLOOD PRESSURE: 98 MMHG | WEIGHT: 129.85 LBS | SYSTOLIC BLOOD PRESSURE: 159 MMHG | HEART RATE: 103 BPM | TEMPERATURE: 98.1 F | OXYGEN SATURATION: 99 % | BODY MASS INDEX: 23.15 KG/M2

## 2019-10-17 VITALS
DIASTOLIC BLOOD PRESSURE: 119 MMHG | OXYGEN SATURATION: 98 % | HEART RATE: 104 BPM | SYSTOLIC BLOOD PRESSURE: 165 MMHG | TEMPERATURE: 97.9 F | RESPIRATION RATE: 16 BRPM

## 2019-10-17 DIAGNOSIS — C92.11 CHRONIC MYELOID LEUKEMIA, BCR/ABL-POSITIVE, IN REMISSION: ICD-10-CM

## 2019-10-17 LAB
ALBUMIN SERPL ELPH-MCNC: 4.5 G/DL
ALP BLD-CCNC: 163 U/L
ALT SERPL-CCNC: 69 U/L
ANION GAP SERPL CALC-SCNC: 14 MMOL/L
AST SERPL-CCNC: 45 U/L
BASOPHILS # BLD AUTO: 0 K/UL — SIGNIFICANT CHANGE UP (ref 0–0.2)
BASOPHILS NFR BLD AUTO: 0.2 % — SIGNIFICANT CHANGE UP (ref 0–2)
BILIRUB SERPL-MCNC: 0.2 MG/DL
BUN SERPL-MCNC: 13 MG/DL
CALCIUM SERPL-MCNC: 10 MG/DL
CHLORIDE SERPL-SCNC: 96 MMOL/L
CO2 SERPL-SCNC: 27 MMOL/L
CREAT SERPL-MCNC: 0.66 MG/DL
DEPRECATED KAPPA LC FREE/LAMBDA SER: 1 RATIO
EOSINOPHIL # BLD AUTO: 0.1 K/UL — SIGNIFICANT CHANGE UP (ref 0–0.5)
EOSINOPHIL NFR BLD AUTO: 1.4 % — SIGNIFICANT CHANGE UP (ref 0–6)
FERRITIN SERPL-MCNC: 1843 NG/ML
GLUCOSE SERPL-MCNC: 468 MG/DL
HCT VFR BLD CALC: 36.2 % — SIGNIFICANT CHANGE UP (ref 34.5–45)
HGB BLD-MCNC: 12.5 G/DL — SIGNIFICANT CHANGE UP (ref 11.5–15.5)
IGA SER QL IEP: 289 MG/DL
IGG SER QL IEP: 1213 MG/DL
IGM SER QL IEP: 156 MG/DL
KAPPA LC CSF-MCNC: 1.35 MG/DL
KAPPA LC SERPL-MCNC: 1.35 MG/DL
LDH SERPL-CCNC: 253 U/L
LYMPHOCYTES # BLD AUTO: 1.8 K/UL — SIGNIFICANT CHANGE UP (ref 1–3.3)
LYMPHOCYTES # BLD AUTO: 40.6 % — SIGNIFICANT CHANGE UP (ref 13–44)
MAGNESIUM SERPL-MCNC: 1.7 MG/DL
MCHC RBC-ENTMCNC: 32.1 PG — SIGNIFICANT CHANGE UP (ref 27–34)
MCHC RBC-ENTMCNC: 34.6 G/DL — SIGNIFICANT CHANGE UP (ref 32–36)
MCV RBC AUTO: 92.9 FL — SIGNIFICANT CHANGE UP (ref 80–100)
MONOCYTES # BLD AUTO: 0.4 K/UL — SIGNIFICANT CHANGE UP (ref 0–0.9)
MONOCYTES NFR BLD AUTO: 8.2 % — SIGNIFICANT CHANGE UP (ref 2–14)
NEUTROPHILS # BLD AUTO: 2.2 K/UL — SIGNIFICANT CHANGE UP (ref 1.8–7.4)
NEUTROPHILS NFR BLD AUTO: 49.6 % — SIGNIFICANT CHANGE UP (ref 43–77)
PLATELET # BLD AUTO: 178 K/UL — SIGNIFICANT CHANGE UP (ref 150–400)
POTASSIUM SERPL-SCNC: 4.6 MMOL/L
PROT SERPL-MCNC: 7.1 G/DL
RBC # BLD: 3.9 M/UL — SIGNIFICANT CHANGE UP (ref 3.8–5.2)
RBC # FLD: 11.8 % — SIGNIFICANT CHANGE UP (ref 10.3–14.5)
SODIUM SERPL-SCNC: 137 MMOL/L
WBC # BLD: 4.4 K/UL — SIGNIFICANT CHANGE UP (ref 3.8–10.5)
WBC # FLD AUTO: 4.4 K/UL — SIGNIFICANT CHANGE UP (ref 3.8–10.5)

## 2019-10-17 PROCEDURE — 88275 CYTOGENETICS 100-300: CPT

## 2019-10-17 PROCEDURE — 88271 CYTOGENETICS DNA PROBE: CPT

## 2019-10-17 PROCEDURE — 99214 OFFICE O/P EST MOD 30 MIN: CPT

## 2019-10-17 PROCEDURE — 88237 TISSUE CULTURE BONE MARROW: CPT

## 2019-10-17 PROCEDURE — 88291 CYTO/MOLECULAR REPORT: CPT | Mod: 59

## 2019-10-17 PROCEDURE — G0452: CPT | Mod: 26

## 2019-10-17 PROCEDURE — 81207 BCR/ABL1 GENE MINOR BP: CPT

## 2019-10-17 PROCEDURE — 81206 BCR/ABL1 GENE MAJOR BP: CPT

## 2019-10-17 NOTE — REVIEW OF SYSTEMS
[Fatigue] : fatigue [Negative] : Heme/Lymph [Vomiting] : vomiting [Diarrhea] : diarrhea [FreeTextEntry2] : trouble regulating body temperature [FreeTextEntry4] : oral irritation [FreeTextEntry7] : nausea [FreeTextEntry5] : circulation issues in feet [FreeTextEntry9] : chronic pain [de-identified] : seizure; neuropathic sx in feet

## 2019-10-17 NOTE — ASSESSMENT
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 \par Discussed once a week for 2 weeks course of Rituxan prior to patient's move to [place]. \par Advised to follow-up with Dr. Carrera before re-locating as well as myself.\par RTC in June 2018 \par \par 10/17/19\par f/u PMD...fluids, rest for viral process, GI\par well care and cancer screening stressed\par Labs sent for CMP, LDH, Mg, BCR/ABL Quantitative, Ferritin serum, FISH, Immunoglobulins panel.\par Peripheral blood work discussed with patient.\par RTC with Dr. Bolaños 6 months

## 2019-10-17 NOTE — ADDENDUM
[FreeTextEntry1] : Documented by Melinda Welch acting as a scribe for Dr. Lia Bolaños on 10/17/2019.\par \par All medical record entries made by the Scribe were at my, Dr. Lia Bolaños, direction and personally dictated by me on 10/17/2019. I have reviewed the chart and agree that  the record accurately reflects my personal performance of the history, physical exam, assessment and plan. I have also personally directed, reviewed, and agree with the discharge instructions.

## 2019-10-17 NOTE — HISTORY OF PRESENT ILLNESS
[de-identified] : CML s/p allo sib 15 years ago....atypical chronic GVHD....was hospitalized several times last year with HTN and seizure activity.... as per neuro pt exhibited  psychogenic seizure\par activity..new onset DM [de-identified] : She reports vomiting and having diarrhea today and feeling cold. She c/o oral irritation. She c/o neuropathic sx in feet and circulation issues as well as compression sock use. She reports following up with Dr. Vaughan every three months, including today, and that she is taking Oxycodone methadone for chronic pain. She also states that she is inquiring with Dr. Vaughan about medical marijuana use. She states she is taking insulin as her sugar has been high for the past year and a half. She also states her most recent A1c is 12. She notes taking Famvir and Bactrim. Hx of seizures. Denies fever/chills, night sweats, mouth sores, eye dryness, blurred vision. No CP, SOB or LE edema.

## 2019-10-17 NOTE — PHYSICAL EXAM
[Ambulatory and capable of all self care but unable to carry out any work activities] : Status 2- Ambulatory and capable of all self care but unable to carry out any work activities. Up and about more than 50% of waking hours [Normal] : affect appropriate [de-identified] : Trace bilateral LE edema noted.

## 2019-10-21 LAB — CHROM ANALY INTERPHASE BLD FISH-IMP: SIGNIFICANT CHANGE UP
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
## 2019-10-21 NOTE — PHYSICAL EXAM
[Restricted in physically strenuous activity but ambulatory and able to carry out work of a light or sedentary nature] : Status 1- Restricted in physically strenuous activity but ambulatory and able to carry out work of a light or sedentary nature, e.g., light house work, office work [Normal] : affect appropriate [de-identified] : end exp wheezing bilaterally
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
## 2019-10-23 LAB — BCR/ABL BY RT - PCR QUANTITATIVE: SIGNIFICANT CHANGE UP

## 2019-10-30 ENCOUNTER — RX RENEWAL (OUTPATIENT)
Age: 54
End: 2019-10-30

## 2019-12-10 ENCOUNTER — RX RENEWAL (OUTPATIENT)
Age: 54
End: 2019-12-10

## 2020-01-01 ENCOUNTER — RX RENEWAL (OUTPATIENT)
Age: 55
End: 2020-01-01

## 2020-02-27 ENCOUNTER — OUTPATIENT (OUTPATIENT)
Dept: OUTPATIENT SERVICES | Facility: HOSPITAL | Age: 55
LOS: 1 days | Discharge: HOME | End: 2020-02-27

## 2020-02-27 DIAGNOSIS — C92.11 CHRONIC MYELOID LEUKEMIA, BCR/ABL-POSITIVE, IN REMISSION: ICD-10-CM

## 2020-03-05 ENCOUNTER — APPOINTMENT (OUTPATIENT)
Dept: HEMATOLOGY ONCOLOGY | Facility: CLINIC | Age: 55
End: 2020-03-05
Payer: MEDICARE

## 2020-03-05 VITALS
SYSTOLIC BLOOD PRESSURE: 128 MMHG | OXYGEN SATURATION: 100 % | WEIGHT: 129.16 LBS | HEART RATE: 106 BPM | DIASTOLIC BLOOD PRESSURE: 93 MMHG | RESPIRATION RATE: 18 BRPM | TEMPERATURE: 98.8 F | BODY MASS INDEX: 23.03 KG/M2

## 2020-03-05 PROCEDURE — 99214 OFFICE O/P EST MOD 30 MIN: CPT

## 2020-03-09 NOTE — HISTORY OF PRESENT ILLNESS
[de-identified] : 51-year-old  woman with a history of CML status post  bone marrow transplant in 1999. She has been in remission and is considered cured of her CML. 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 We see her periodically for medication management as she is reasonably well controlled with her current regimen.this includes gabapentin, methadone, and oxycodone on a p.r.n. basis.\par In February 2016, she tripped and fell over a pothole. She denies any trauma to the head, but describes almost a whip-lash injury. A few days later she started to have mild cognitive changes including word-finding, coordination and balance difficulties consistent with post-concussive syndrome. She also noticed that her regular neuropathic pain s/p Rituxan with 5/10 intensity became significantly increased to 8/10, especially her right-sided tightness, creepy-crawly sensation along both thighs, and burning lancinating pain in bilateral feet. By the end of the week, she suffered a significant headache with worsening overall pain, requiring admission to [facility]. Patient was found to have urgent hypertension and new-onset of PM seizures, the etiology of which has not been determined. The patient does have a Hx of CNS infarct while on MMF, CSA and  as per Dr Bolaños.\par \par Since 2/2016, the pain exacerbation has improved, but has been replaced by sub-occipital to coccygeal cold twisting knife-like tension along her spine that intermittently happens 1-2 times weekly. Massage therapy in July 2016 relaxed muscle spasms on low-right cervical muscles and upper trapezius, but also caused cracking. Since that time, right neck spasm improved.\par She is being followed for a history of status epilepticus and a very atypical seizure disorder at [facility] and was hospitalized there in 2015 on several occasions.\par She continues to see Dr Bolaños in follow up and remains on antibiotic prophylaxis.\par  [de-identified] : No new c/o<Here for structured visit..Opioid use pattern..stable for years:6 am, 2PM, 630..9PM.Functional and w no misuse iissues since treatment inception.

## 2020-03-09 NOTE — PHYSICAL EXAM
[Restricted in physically strenuous activity but ambulatory and able to carry out work of a light or sedentary nature] : Status 1- Restricted in physically strenuous activity but ambulatory and able to carry out work of a light or sedentary nature, e.g., light house work, office work [Normal] : affect appropriate [de-identified] : end exp wheezing bilaterally
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
## 2020-05-12 ENCOUNTER — OUTPATIENT (OUTPATIENT)
Dept: OUTPATIENT SERVICES | Facility: HOSPITAL | Age: 55
LOS: 1 days | Discharge: HOME | End: 2020-05-12

## 2020-05-12 DIAGNOSIS — C92.11 CHRONIC MYELOID LEUKEMIA, BCR/ABL-POSITIVE, IN REMISSION: ICD-10-CM

## 2020-05-14 ENCOUNTER — APPOINTMENT (OUTPATIENT)
Dept: HEMATOLOGY ONCOLOGY | Facility: CLINIC | Age: 55
End: 2020-05-14
Payer: MEDICARE

## 2020-05-14 PROCEDURE — 99442: CPT

## 2020-09-23 ENCOUNTER — OUTPATIENT (OUTPATIENT)
Dept: OUTPATIENT SERVICES | Facility: HOSPITAL | Age: 55
LOS: 1 days | Discharge: HOME | End: 2020-09-23

## 2020-09-23 DIAGNOSIS — C92.11 CHRONIC MYELOID LEUKEMIA, BCR/ABL-POSITIVE, IN REMISSION: ICD-10-CM

## 2020-09-29 ENCOUNTER — APPOINTMENT (OUTPATIENT)
Dept: HEMATOLOGY ONCOLOGY | Facility: CLINIC | Age: 55
End: 2020-09-29
Payer: MEDICARE

## 2020-09-29 VITALS
SYSTOLIC BLOOD PRESSURE: 135 MMHG | HEIGHT: 61.42 IN | HEART RATE: 102 BPM | WEIGHT: 136.69 LBS | RESPIRATION RATE: 16 BRPM | TEMPERATURE: 98.4 F | BODY MASS INDEX: 25.48 KG/M2 | OXYGEN SATURATION: 96 % | DIASTOLIC BLOOD PRESSURE: 92 MMHG

## 2020-09-29 DIAGNOSIS — R60.0 LOCALIZED EDEMA: ICD-10-CM

## 2020-09-29 DIAGNOSIS — M89.8X9 OTHER SPECIFIED DISORDERS OF BONE, UNSPECIFIED SITE: ICD-10-CM

## 2020-09-29 PROCEDURE — 99215 OFFICE O/P EST HI 40 MIN: CPT
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
## 2020-09-30 NOTE — HISTORY OF PRESENT ILLNESS
[de-identified] : 51-year-old  woman with a history of CML status post  bone marrow transplant in 1999. She has been in remission and is considered cured of her CML. 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 We see her periodically for medication management as she is reasonably well controlled with her current regimen.this includes gabapentin, methadone, and oxycodone on a p.r.n. basis.\par In February 2016, she tripped and fell over a pothole. She denies any trauma to the head, but describes almost a whip-lash injury. A few days later she started to have mild cognitive changes including word-finding, coordination and balance difficulties consistent with post-concussive syndrome. She also noticed that her regular neuropathic pain s/p Rituxan with 5/10 intensity became significantly increased to 8/10, especially her right-sided tightness, creepy-crawly sensation along both thighs, and burning lancinating pain in bilateral feet. By the end of the week, she suffered a significant headache with worsening overall pain, requiring admission to [facility]. Patient was found to have urgent hypertension and new-onset of PM seizures, the etiology of which has not been determined. The patient does have a Hx of CNS infarct while on MMF, CSA and  as per Dr Bolaños.\par \par Since 2/2016, the pain exacerbation has improved, but has been replaced by sub-occipital to coccygeal cold twisting knife-like tension along her spine that intermittently happens 1-2 times weekly. Massage therapy in July 2016 relaxed muscle spasms on low-right cervical muscles and upper trapezius, but also caused cracking. Since that time, right neck spasm improved.\par She is being followed for a history of status epilepticus and a very atypical seizure disorder at [facility] and was hospitalized there in 2015 on several occasions.\par She continues to see Dr Bolaños in follow up and remains on antibiotic prophylaxis.\par  [de-identified] : Inc pain and swelling of both legs.Doppler neg per PCP.Inc pain as a result.legs and hips/LBP.Not changing med doses tho.

## 2020-09-30 NOTE — PHYSICAL EXAM
[Restricted in physically strenuous activity but ambulatory and able to carry out work of a light or sedentary nature] : Status 1- Restricted in physically strenuous activity but ambulatory and able to carry out work of a light or sedentary nature, e.g., light house work, office work [Normal] : grossly intact [de-identified] : 1+ swelling distal LE's.Nonpitting. [de-identified] : end exp wheezing bilaterally

## 2020-11-09 ENCOUNTER — APPOINTMENT (OUTPATIENT)
Dept: RHEUMATOLOGY | Facility: CLINIC | Age: 55
End: 2020-11-09
Payer: MEDICARE

## 2020-11-09 VITALS
RESPIRATION RATE: 16 BRPM | WEIGHT: 133 LBS | DIASTOLIC BLOOD PRESSURE: 94 MMHG | OXYGEN SATURATION: 96 % | HEIGHT: 61.42 IN | BODY MASS INDEX: 24.79 KG/M2 | HEART RATE: 118 BPM | TEMPERATURE: 97.2 F | SYSTOLIC BLOOD PRESSURE: 149 MMHG

## 2020-11-09 DIAGNOSIS — M25.50 PAIN IN UNSPECIFIED JOINT: ICD-10-CM

## 2020-11-09 DIAGNOSIS — M79.2 NEURALGIA AND NEURITIS, UNSPECIFIED: ICD-10-CM

## 2020-11-09 PROCEDURE — 99204 OFFICE O/P NEW MOD 45 MIN: CPT | Mod: 25

## 2020-11-09 PROCEDURE — 99072 ADDL SUPL MATRL&STAF TM PHE: CPT

## 2020-11-11 ENCOUNTER — NON-APPOINTMENT (OUTPATIENT)
Age: 55
End: 2020-11-11

## 2020-11-11 DIAGNOSIS — R79.89 OTHER SPECIFIED ABNORMAL FINDINGS OF BLOOD CHEMISTRY: ICD-10-CM

## 2020-11-11 PROBLEM — M25.50 JOINT PAIN: Status: ACTIVE | Noted: 2020-11-11

## 2020-11-11 LAB
25(OH)D3 SERPL-MCNC: 38.5 NG/ML
ALBUMIN SERPL ELPH-MCNC: 4.7 G/DL
ALP BLD-CCNC: 169 U/L
ALT SERPL-CCNC: 60 U/L
ANION GAP SERPL CALC-SCNC: 14 MMOL/L
AST SERPL-CCNC: 49 U/L
BASOPHILS # BLD AUTO: 0.03 K/UL
BASOPHILS NFR BLD AUTO: 0.4 %
BILIRUB SERPL-MCNC: 0.2 MG/DL
BUN SERPL-MCNC: 11 MG/DL
CALCIUM SERPL-MCNC: 9.5 MG/DL
CHLORIDE SERPL-SCNC: 95 MMOL/L
CO2 SERPL-SCNC: 28 MMOL/L
CREAT SERPL-MCNC: 0.76 MG/DL
CRP SERPL-MCNC: 0.99 MG/DL
ENA SS-A AB SER IA-ACNC: <0.2 AL
ENA SS-B AB SER IA-ACNC: <0.2 AL
EOSINOPHIL # BLD AUTO: 0.07 K/UL
EOSINOPHIL NFR BLD AUTO: 1 %
ERYTHROCYTE [SEDIMENTATION RATE] IN BLOOD BY WESTERGREN METHOD: 30 MM/HR
ESTIMATED AVERAGE GLUCOSE: 263 MG/DL
FOLATE SERPL-MCNC: >20 NG/ML
GLUCOSE SERPL-MCNC: 375 MG/DL
HBA1C MFR BLD HPLC: 10.8 %
HCT VFR BLD CALC: 33.5 %
HGB BLD-MCNC: 10.3 G/DL
IMM GRANULOCYTES NFR BLD AUTO: 0.4 %
LYMPHOCYTES # BLD AUTO: 2.84 K/UL
LYMPHOCYTES NFR BLD AUTO: 42.5 %
MAN DIFF?: NORMAL
MCHC RBC-ENTMCNC: 30 PG
MCHC RBC-ENTMCNC: 30.7 GM/DL
MCV RBC AUTO: 97.7 FL
MONOCYTES # BLD AUTO: 0.62 K/UL
MONOCYTES NFR BLD AUTO: 9.3 %
NEUTROPHILS # BLD AUTO: 3.1 K/UL
NEUTROPHILS NFR BLD AUTO: 46.4 %
PLATELET # BLD AUTO: 200 K/UL
POTASSIUM SERPL-SCNC: 3.9 MMOL/L
PROT SERPL-MCNC: 7.4 G/DL
RBC # BLD: 3.43 M/UL
RBC # FLD: 13.2 %
RHEUMATOID FACT SER QL: 12 IU/ML
SODIUM SERPL-SCNC: 138 MMOL/L
TSH SERPL-ACNC: 1.91 UIU/ML
VIT B12 SERPL-MCNC: >2000 PG/ML
WBC # FLD AUTO: 6.69 K/UL

## 2021-01-05 ENCOUNTER — OUTPATIENT (OUTPATIENT)
Dept: OUTPATIENT SERVICES | Facility: HOSPITAL | Age: 56
LOS: 1 days | Discharge: HOME | End: 2021-01-05

## 2021-01-05 ENCOUNTER — APPOINTMENT (OUTPATIENT)
Dept: HEMATOLOGY ONCOLOGY | Facility: CLINIC | Age: 56
End: 2021-01-05

## 2021-01-05 DIAGNOSIS — D89.813 GRAFT-VERSUS-HOST DISEASE, UNSPECIFIED: ICD-10-CM

## 2021-01-05 DIAGNOSIS — C92.11 CHRONIC MYELOID LEUKEMIA, BCR/ABL-POSITIVE, IN REMISSION: ICD-10-CM

## 2021-01-05 DIAGNOSIS — T86.00 UNSPECIFIED COMPLICATION OF BONE MARROW TRANSPLANT: ICD-10-CM

## 2021-01-27 ENCOUNTER — APPOINTMENT (OUTPATIENT)
Dept: HEMATOLOGY ONCOLOGY | Facility: CLINIC | Age: 56
End: 2021-01-27
Payer: MEDICARE

## 2021-01-27 ENCOUNTER — RESULT REVIEW (OUTPATIENT)
Age: 56
End: 2021-01-27

## 2021-01-27 VITALS
HEART RATE: 125 BPM | RESPIRATION RATE: 16 BRPM | WEIGHT: 132.28 LBS | DIASTOLIC BLOOD PRESSURE: 90 MMHG | TEMPERATURE: 97.3 F | SYSTOLIC BLOOD PRESSURE: 131 MMHG | OXYGEN SATURATION: 94 % | BODY MASS INDEX: 24.65 KG/M2

## 2021-01-27 DIAGNOSIS — D89.813 COMPLICATIONS OF STEM CELL TRANSPLANT: ICD-10-CM

## 2021-01-27 DIAGNOSIS — T86.5 COMPLICATIONS OF STEM CELL TRANSPLANT: ICD-10-CM

## 2021-01-27 DIAGNOSIS — G89.4 CHRONIC PAIN SYNDROME: ICD-10-CM

## 2021-01-27 LAB
ALBUMIN SERPL ELPH-MCNC: 4.5 G/DL
ALP BLD-CCNC: 137 U/L
ALT SERPL-CCNC: 35 U/L
ANION GAP SERPL CALC-SCNC: 13 MMOL/L
AST SERPL-CCNC: 33 U/L
BASOPHILS # BLD AUTO: 0.02 K/UL — SIGNIFICANT CHANGE UP (ref 0–0.2)
BASOPHILS NFR BLD AUTO: 0.3 % — SIGNIFICANT CHANGE UP (ref 0–2)
BILIRUB SERPL-MCNC: 0.2 MG/DL
BUN SERPL-MCNC: 10 MG/DL
CALCIUM SERPL-MCNC: 9.5 MG/DL
CHLORIDE SERPL-SCNC: 101 MMOL/L
CO2 SERPL-SCNC: 26 MMOL/L
CREAT SERPL-MCNC: 0.98 MG/DL
EOSINOPHIL # BLD AUTO: 0.06 K/UL — SIGNIFICANT CHANGE UP (ref 0–0.5)
EOSINOPHIL NFR BLD AUTO: 0.8 % — SIGNIFICANT CHANGE UP (ref 0–6)
FERRITIN SERPL-MCNC: 1106 NG/ML
GLUCOSE SERPL-MCNC: 201 MG/DL
HCT VFR BLD CALC: 34.2 % — LOW (ref 34.5–45)
HGB BLD-MCNC: 11.3 G/DL — LOW (ref 11.5–15.5)
IMM GRANULOCYTES NFR BLD AUTO: 0.3 % — SIGNIFICANT CHANGE UP (ref 0–1.5)
LDH SERPL-CCNC: 245 U/L
LYMPHOCYTES # BLD AUTO: 2.82 K/UL — SIGNIFICANT CHANGE UP (ref 1–3.3)
LYMPHOCYTES # BLD AUTO: 39.9 % — SIGNIFICANT CHANGE UP (ref 13–44)
MAGNESIUM SERPL-MCNC: 1.6 MG/DL
MCHC RBC-ENTMCNC: 30.2 PG — SIGNIFICANT CHANGE UP (ref 27–34)
MCHC RBC-ENTMCNC: 33 G/DL — SIGNIFICANT CHANGE UP (ref 32–36)
MCV RBC AUTO: 91.4 FL — SIGNIFICANT CHANGE UP (ref 80–100)
MONOCYTES # BLD AUTO: 0.55 K/UL — SIGNIFICANT CHANGE UP (ref 0–0.9)
MONOCYTES NFR BLD AUTO: 7.8 % — SIGNIFICANT CHANGE UP (ref 2–14)
NEUTROPHILS # BLD AUTO: 3.59 K/UL — SIGNIFICANT CHANGE UP (ref 1.8–7.4)
NEUTROPHILS NFR BLD AUTO: 50.9 % — SIGNIFICANT CHANGE UP (ref 43–77)
NRBC # BLD: 0 /100 WBCS — SIGNIFICANT CHANGE UP (ref 0–0)
PLATELET # BLD AUTO: 249 K/UL — SIGNIFICANT CHANGE UP (ref 150–400)
POTASSIUM SERPL-SCNC: 4.1 MMOL/L
PROT SERPL-MCNC: 7 G/DL
RBC # BLD: 3.74 M/UL — LOW (ref 3.8–5.2)
RBC # FLD: 13 % — SIGNIFICANT CHANGE UP (ref 10.3–14.5)
SODIUM SERPL-SCNC: 140 MMOL/L
WBC # BLD: 7.06 K/UL — SIGNIFICANT CHANGE UP (ref 3.8–10.5)
WBC # FLD AUTO: 7.06 K/UL — SIGNIFICANT CHANGE UP (ref 3.8–10.5)

## 2021-01-27 PROCEDURE — 99213 OFFICE O/P EST LOW 20 MIN: CPT

## 2021-01-27 PROCEDURE — 99072 ADDL SUPL MATRL&STAF TM PHE: CPT

## 2021-01-27 NOTE — REASON FOR VISIT
[Follow-Up Visit] : a follow-up visit for [Chronic Leukemia] : chronic leukemia [FreeTextEntry2] : CML s/p allo sib over 15 years ago

## 2021-01-27 NOTE — REVIEW OF SYSTEMS
[Fatigue] : fatigue [Negative] : Gastrointestinal [Vomiting] : no vomiting [Diarrhea] : no diarrhea [FreeTextEntry5] : circulation issues in feet [FreeTextEntry2] : trouble regulating body temperature [FreeTextEntry7] : nausea [FreeTextEntry9] : chronic pain [de-identified] : seizure; neuropathic sx in feet

## 2021-01-27 NOTE — HISTORY OF PRESENT ILLNESS
[de-identified] : CML s/p allo sib 20 years ago....atypical chronic GVHD...chronic pain....was hospitalized several times several years ago  with HTN and seizure activity.... as per neuro pt exhibited  psychogenic seizure\par activity..new onset DM [de-identified] : . She c/o neuropathic sx in feet and circulation issues as well as compression sock use. She reports following up with Dr. Vaughan every three months, he is retired...she is taking Oxycodone methadone for chronic pain. . She states she is taking insulin as her sugar has been high for the past year and a half. She also states her most recent A1c is 12. She notes taking Famvir and Bactrim. Hx of seizures. Denies fever/chills, night sweats, mouth sores, eye dryness, blurred vision. No CP, SOB or LE edema.

## 2021-01-27 NOTE — PHYSICAL EXAM
[Ambulatory and capable of all self care but unable to carry out any work activities] : Status 2- Ambulatory and capable of all self care but unable to carry out any work activities. Up and about more than 50% of waking hours [Normal] : affect appropriate [de-identified] : Trace bilateral LE edema noted.

## 2021-01-27 NOTE — ASSESSMENT
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

## 2021-01-28 LAB
DEPRECATED KAPPA LC FREE/LAMBDA SER: 1.2 RATIO
IGA SER QL IEP: 307 MG/DL
IGG SER QL IEP: 1071 MG/DL
IGM SER QL IEP: 155 MG/DL
KAPPA LC CSF-MCNC: 1.45 MG/DL
KAPPA LC SERPL-MCNC: 1.74 MG/DL

## 2021-02-01 LAB — T(9;22)(ABL1,BCR)/CONTROL BLD/T: NORMAL

## 2021-05-26 ENCOUNTER — OUTPATIENT (OUTPATIENT)
Dept: OUTPATIENT SERVICES | Facility: HOSPITAL | Age: 56
LOS: 1 days | Discharge: HOME | End: 2021-05-26

## 2021-05-26 DIAGNOSIS — C92.11 CHRONIC MYELOID LEUKEMIA, BCR/ABL-POSITIVE, IN REMISSION: ICD-10-CM

## 2021-05-27 ENCOUNTER — APPOINTMENT (OUTPATIENT)
Dept: HEMATOLOGY ONCOLOGY | Facility: CLINIC | Age: 56
End: 2021-05-27
Payer: MEDICARE

## 2021-05-27 DIAGNOSIS — G89.29 OTHER CHRONIC PAIN: ICD-10-CM

## 2021-05-27 PROCEDURE — 99443: CPT

## 2021-05-27 RX ORDER — DICLOFENAC SODIUM 10 MG/G
1 GEL TOPICAL DAILY
Qty: 100 | Refills: 0 | Status: DISCONTINUED | COMMUNITY
Start: 2018-12-17 | End: 2021-05-27

## 2021-05-27 RX ORDER — HYDROCHLOROTHIAZIDE 25 MG/1
25 TABLET ORAL DAILY
Qty: 30 | Refills: 0 | Status: DISCONTINUED | COMMUNITY
Start: 2020-09-29 | End: 2021-05-27

## 2021-05-27 RX ORDER — GLIMEPIRIDE 2 MG/1
2 TABLET ORAL
Qty: 90 | Refills: 0 | Status: DISCONTINUED | COMMUNITY
Start: 2017-01-05 | End: 2021-05-27

## 2021-05-27 RX ORDER — AMOXICILLIN 500 MG/1
500 TABLET, FILM COATED ORAL
Qty: 28 | Refills: 0 | Status: DISCONTINUED | COMMUNITY
Start: 2016-08-17 | End: 2021-05-27

## 2021-05-27 RX ORDER — LIDOCAINE AND PRILOCAINE 25; 25 MG/G; MG/G
2.5-2.5 CREAM TOPICAL
Qty: 60 | Refills: 6 | Status: DISCONTINUED | COMMUNITY
Start: 2018-12-17 | End: 2021-05-27

## 2021-05-27 RX ORDER — IPRATROPIUM BROMIDE AND ALBUTEROL 20; 100 UG/1; UG/1
20-100 SPRAY, METERED RESPIRATORY (INHALATION) 4 TIMES DAILY
Qty: 4 | Refills: 6 | Status: DISCONTINUED | COMMUNITY
Start: 2017-04-27 | End: 2021-05-27

## 2021-05-27 RX ORDER — TRIAMCINOLONE ACETONIDE 1 MG/G
0.1 CREAM TOPICAL
Qty: 30 | Refills: 0 | Status: DISCONTINUED | COMMUNITY
Start: 2017-01-05 | End: 2021-05-27

## 2021-05-27 RX ORDER — DICLOFENAC SODIUM 10 MG/G
1 GEL TOPICAL DAILY
Qty: 100 | Refills: 6 | Status: DISCONTINUED | COMMUNITY
Start: 2018-12-18 | End: 2021-05-27

## 2021-06-01 NOTE — HISTORY OF PRESENT ILLNESS
[de-identified] : CML s/p allo sib 20 years ago....atypical chronic GVHD...chronic pain....was hospitalized several times several years ago  with HTN and seizure activity.... as per neuro pt exhibited  psychogenic seizure\par activity..new onset DM [de-identified] : . She c/o neuropathic sx in feet and circulation issues as well as compression sock use. She reports following up with Dr. Vaughan every three months, he is retired...she is taking Oxycodone methadone for chronic pain. . She states she is taking insulin as her sugar has been high for the past year and a half. She also states her most recent A1c is 12. She notes taking Famvir and Bactrim. Hx of seizures. Denies fever/chills, night sweats, mouth sores, eye dryness, blurred vision. No CP, SOB or LE edema. \par \par On 5/27/21, verbal consent obtained for today's telephone visit. Overall patient is well and offers no acute concerns. Complains of dry eyes and generalized chronic pain. Takes gabapentin, methadone and oxycodone for pain. Admits does not inject insulin as prescribed. Complains of generalized joint stiffness and states does not have much of an appetite. Received second COVID 19 vaccine on 5/17/21 and had some side effects that have resolved. Denies fever, chills, nausea, vomiting, diarrhea, rash, mouth sores or any signs of bleeding. Denies SOB, chest pain or B/L LE edema. Admits had indigestion which has resolved with OTC medication.

## 2021-06-01 NOTE — PHYSICAL EXAM
[Ambulatory and capable of all self care but unable to carry out any work activities] : Status 2- Ambulatory and capable of all self care but unable to carry out any work activities. Up and about more than 50% of waking hours [No active (erythematous_ GVHD rash)] : Skin: No active (erythematous_ GVHD rash) [< 2 mg/dl] : Liver: < 2 mg/dl [No or intermittent] : Upper GI: No or intermittent nausea, vomiting or anorexia [<500 ml/day or < 3 episodes/day] : Lower GI (stool output/day): <500 ml/day or <3 episodes/day [No] : No

## 2021-06-01 NOTE — ASSESSMENT
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...atypical seizure disorder which may be related to her gvhd.....chronic pain syndrome.....elevated ferritin.....chronic mouth sores......I would consider  redosing  rituxan if flare of GVHD...stable / resolved  at this time..no treatment needed.\par Labs sent for cbc, cmp, ferritin and fish fro bcr abl.\par Peripheral blood counts discussed with patient.\par Advised to follow-up with PMD\par Will order pain meds until pt is able to re establish with pain md\par RTC in 3 months\par \par 5/27/21:\par 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 Compliance with insulin stressed.\par Continue to follow up with endocrinology\par Blood work sent January. Repeat blood work at next visit.\par Received Second COVID 19 vaccine on 5/17/21\par Questions and concerns addressed. Reassurance provided. \par Follow up with Dr Bolaños on 9/21/21\par Time spent on phone: 23 minutes\par Verbal consent obtained for today's visit.\par

## 2021-06-01 NOTE — REVIEW OF SYSTEMS
[Fever] : no fever [Chills] : no chills [Night Sweats] : no night sweats [Fatigue] : no fatigue [Eye Pain] : no eye pain [Red Eyes] : eyes not red [Dry Eyes] : dryness of the eyes [Vision Problems] : no vision problems [Abdominal Pain] : no abdominal pain [Vomiting] : no vomiting [Constipation] : no constipation [Diarrhea] : no diarrhea [Joint Pain] : no joint pain [Joint Stiffness] : joint stiffness [Muscle Pain] : no muscle pain [Muscle Weakness] : no muscle weakness [Negative] : Neurological [FreeTextEntry7] : No appetite, indigestion resolved [FreeTextEntry9] : chronic pain

## 2021-09-20 ENCOUNTER — OUTPATIENT (OUTPATIENT)
Dept: OUTPATIENT SERVICES | Facility: HOSPITAL | Age: 56
LOS: 1 days | Discharge: HOME | End: 2021-09-20

## 2021-09-20 DIAGNOSIS — C92.11 CHRONIC MYELOID LEUKEMIA, BCR/ABL-POSITIVE, IN REMISSION: ICD-10-CM

## 2021-09-21 ENCOUNTER — APPOINTMENT (OUTPATIENT)
Dept: HEMATOLOGY ONCOLOGY | Facility: CLINIC | Age: 56
End: 2021-09-21

## 2021-11-01 RX ORDER — METHADONE HYDROCHLORIDE 10 MG/1
10 TABLET ORAL 3 TIMES DAILY
Qty: 84 | Refills: 0 | Status: ACTIVE | COMMUNITY
Start: 2017-09-28 | End: 1900-01-01

## 2021-11-08 ENCOUNTER — APPOINTMENT (OUTPATIENT)
Dept: PAIN MANAGEMENT | Facility: CLINIC | Age: 56
End: 2021-11-08
Payer: MEDICARE

## 2021-11-08 VITALS
WEIGHT: 127 LBS | BODY MASS INDEX: 23.67 KG/M2 | SYSTOLIC BLOOD PRESSURE: 143 MMHG | DIASTOLIC BLOOD PRESSURE: 91 MMHG | HEIGHT: 61.45 IN | HEART RATE: 122 BPM

## 2021-11-08 PROCEDURE — 99204 OFFICE O/P NEW MOD 45 MIN: CPT

## 2021-12-28 ENCOUNTER — OUTPATIENT (OUTPATIENT)
Dept: OUTPATIENT SERVICES | Facility: HOSPITAL | Age: 56
LOS: 1 days | Discharge: HOME | End: 2021-12-28

## 2021-12-28 DIAGNOSIS — C92.11 CHRONIC MYELOID LEUKEMIA, BCR/ABL-POSITIVE, IN REMISSION: ICD-10-CM

## 2021-12-30 ENCOUNTER — APPOINTMENT (OUTPATIENT)
Dept: HEMATOLOGY ONCOLOGY | Facility: CLINIC | Age: 56
End: 2021-12-30
Payer: MEDICARE

## 2021-12-30 DIAGNOSIS — Z87.39 PERSONAL HISTORY OF OTHER DISEASES OF THE MUSCULOSKELETAL SYSTEM AND CONNECTIVE TISSUE: ICD-10-CM

## 2021-12-30 DIAGNOSIS — D89.811 CHRONIC GRAFT-VERSUS-HOST DISEASE: ICD-10-CM

## 2021-12-30 DIAGNOSIS — Z87.891 PERSONAL HISTORY OF NICOTINE DEPENDENCE: ICD-10-CM

## 2021-12-30 DIAGNOSIS — C92.11 CHRONIC MYELOID LEUKEMIA, BCR/ABL-POSITIVE, IN REMISSION: ICD-10-CM

## 2021-12-30 DIAGNOSIS — Z86.69 PERSONAL HISTORY OF OTHER DISEASES OF THE NERVOUS SYSTEM AND SENSE ORGANS: ICD-10-CM

## 2021-12-30 DIAGNOSIS — G40.909 EPILEPSY, UNSPECIFIED, NOT INTRACTABLE, W/OUT STATUS EPILEPTICUS: ICD-10-CM

## 2021-12-30 PROCEDURE — 99443: CPT

## 2021-12-30 NOTE — REVIEW OF SYSTEMS
[Dry Eyes] : dryness of the eyes [Joint Stiffness] : joint stiffness [Negative] : Allergic/Immunologic [Fever] : no fever [Chills] : no chills [Night Sweats] : no night sweats [Fatigue] : no fatigue [Eye Pain] : no eye pain [Red Eyes] : eyes not red [Vision Problems] : no vision problems [Abdominal Pain] : no abdominal pain [Vomiting] : no vomiting [Constipation] : no constipation [Diarrhea] : no diarrhea [Joint Pain] : no joint pain [Muscle Pain] : no muscle pain [Muscle Weakness] : no muscle weakness [FreeTextEntry7] : No appetite, indigestion resolved [FreeTextEntry9] : chronic pain

## 2021-12-30 NOTE — ASSESSMENT
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...atypical seizure disorder which may be related to her gvhd.....chronic pain syndrome.....elevated ferritin.....chronic mouth sores......I would consider  redosing  rituxan if flare of GVHD...stable / resolved  at this time..no treatment needed.\par Labs sent for cbc, cmp, ferritin and fish fro bcr abl.\par Peripheral blood counts discussed with patient.\par Advised to follow-up with PMD\par Will order pain meds until pt is able to re establish with pain md\par RTC in 3 months\par \par 12/30/21:\par 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 Compliance with insulin stressed.\par Continue to follow up with endocrinology and new pain md\par Blood work locally with pmd\par Received Second COVID 19 vaccine on 5/17/21....advised covid  booster and flu shot...pt declines flu\par Questions and concerns addressed. Reassurance provided. \par Follow up with Dr Bolaños 6 months\par Time spent on phone: 25 minutes\par Verbal consent obtained for today's visit.\par

## 2021-12-30 NOTE — HISTORY OF PRESENT ILLNESS
[de-identified] : CML s/p allo sib 20 years ago....atypical chronic GVHD...chronic pain....was hospitalized several times several years ago  with HTN and seizure activity.... as per neuro pt exhibited  psychogenic seizure\par activity..new onset DM [de-identified] : . She c/o neuropathic sx in feet and circulation issues as well as compression sock use. She reports following up with Dr. Vaughan every three months, he is retired...she is taking Oxycodone methadone for chronic pain. . She states she is taking insulin as her sugar has been high for the past year and a half. She also states her most recent A1c is 12. She notes taking Famvir and Bactrim. Hx of seizures. Denies fever/chills, night sweats, mouth sores, eye dryness, blurred vision. No CP, SOB or LE edema. \par \par On 5/27/21, verbal consent obtained for today's telephone visit. Overall patient is well and offers no acute concerns. Complains of dry eyes and generalized chronic pain. Takes gabapentin, methadone and oxycodone for pain. Admits does not inject insulin as prescribed. Complains of generalized joint stiffness and states does not have much of an appetite. Received second COVID 19 vaccine on 5/17/21 and had some side effects that have resolved. Denies fever, chills, nausea, vomiting, diarrhea, rash, mouth sores or any signs of bleeding. Denies SOB, chest pain or B/L LE edema. Admits had indigestion which has resolved with OTC medication. \par \par 12/30/21 Consents verbally for tele visit.. Pt seeing new pain doc..extremely unhappy with Dr You.....using medical marijuana now for pain

## 2022-05-31 NOTE — ASSESSMENT
[FreeTextEntry1] : \par \par 57-year-old female with a history of CML status post  bone marrow transplant in 1999. She has been in remission and is considered cured of her CML. 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 \par Consider gabapentinoids, TCAs for better treatment for neuropathic pain.

## 2022-05-31 NOTE — HISTORY OF PRESENT ILLNESS
[FreeTextEntry1] : 57-year-old female with a history of CML status post  bone marrow transplant in 1999. She has been in remission and is considered cured of her CML. 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  \par MILLY 6/10 \par Able to perform ADLs\par The patient reports being on Methadone 10 mgs and Oxycodone 30 mgs up to 6 per day for many years\par \par  [de-identified] : \par

## 2022-05-31 NOTE — PHYSICAL EXAM
[FreeTextEntry1] : Constitutional: No signs of distress. No signs of toxicity. \par MS: Alert and well oriented. Speech fluent. No aphasia. Fund of knowledge intact. \par Psychiatric: Mood stable.\par CN: PERRLA: No papilledema; No VFC: No Ye. V1-3 intact. No facial asymmetry. palate elevates symmetrically, tongue midline\par Motor: Adequate bulk, tone, 4/5 strength throughout\par DTR: present and symmetrical; no clonus\par Sensory: intact to primary and secondary modalities with the exception of absent vibratory in both lower extremities\par Cerebellar and gait: intact\par Eyes: no redness or swelling\par HEENT: intact\par Neck: No masses noted\par Pulmonary: no respiratory distress\par Vascular: no temperature,color changes; no edema\par Musculoskeletal: examination - reveals cervical and lumbar paraspinal tenderness to palpation\par Abd: non tender\par Skin: No rash.\par

## 2022-06-22 ENCOUNTER — APPOINTMENT (OUTPATIENT)
Dept: HEART AND VASCULAR | Facility: CLINIC | Age: 57
End: 2022-06-22

## 2022-07-13 ENCOUNTER — NON-APPOINTMENT (OUTPATIENT)
Age: 57
End: 2022-07-13

## 2022-07-13 ENCOUNTER — APPOINTMENT (OUTPATIENT)
Dept: HEART AND VASCULAR | Facility: CLINIC | Age: 57
End: 2022-07-13

## 2022-07-13 VITALS
DIASTOLIC BLOOD PRESSURE: 111 MMHG | HEIGHT: 61 IN | HEART RATE: 99 BPM | OXYGEN SATURATION: 98 % | BODY MASS INDEX: 21.9 KG/M2 | WEIGHT: 116 LBS | TEMPERATURE: 98.2 F | SYSTOLIC BLOOD PRESSURE: 153 MMHG

## 2022-07-13 DIAGNOSIS — Z78.9 OTHER SPECIFIED HEALTH STATUS: ICD-10-CM

## 2022-07-13 DIAGNOSIS — I10 ESSENTIAL (PRIMARY) HYPERTENSION: ICD-10-CM

## 2022-07-13 PROCEDURE — 93000 ELECTROCARDIOGRAM COMPLETE: CPT

## 2022-07-13 PROCEDURE — 99203 OFFICE O/P NEW LOW 30 MIN: CPT

## 2022-07-13 RX ORDER — ATORVASTATIN CALCIUM 20 MG/1
20 TABLET, FILM COATED ORAL DAILY
Refills: 0 | Status: ACTIVE | COMMUNITY

## 2022-07-14 NOTE — ASSESSMENT
[FreeTextEntry1] : - Elevated BP she is resistant to taking medications\par - will do home draw and echo\par - check urine albumin\par - fu after echo and decide what she wants to do \par - sob she feels is due to her treatment wants to wait to consider ischemic work up

## 2022-07-14 NOTE — HISTORY OF PRESENT ILLNESS
[FreeTextEntry1] : 57 F CML Bone transplant 2000 RAdiation GVHD Psychogenic Seizures  D/O DM Cataracts Chronic Pain Neuropathy On Oxycodone Methadone \par \par \par unclear who referred here due to opiate withdrawal was noted to have high BP she attributes her high BP due to pain she thinks she is here for BP management she is short of breath \par \par \par ecg nsr 7/13/2022 \par \par

## 2022-07-17 LAB
ALBUMIN SERPL ELPH-MCNC: 5 G/DL
ALP BLD-CCNC: 139 U/L
ALT SERPL-CCNC: 35 U/L
ANION GAP SERPL CALC-SCNC: 13 MMOL/L
AST SERPL-CCNC: 31 U/L
BASOPHILS # BLD AUTO: 0.02 K/UL
BASOPHILS NFR BLD AUTO: 0.4 %
BILIRUB SERPL-MCNC: 0.3 MG/DL
BUN SERPL-MCNC: 16 MG/DL
CALCIUM SERPL-MCNC: 9.5 MG/DL
CHLORIDE SERPL-SCNC: 97 MMOL/L
CHOLEST SERPL-MCNC: 177 MG/DL
CO2 SERPL-SCNC: 24 MMOL/L
CREAT SERPL-MCNC: 1.07 MG/DL
EGFR: 61 ML/MIN/1.73M2
EOSINOPHIL # BLD AUTO: 0.04 K/UL
EOSINOPHIL NFR BLD AUTO: 0.7 %
ESTIMATED AVERAGE GLUCOSE: 344 MG/DL
GLUCOSE SERPL-MCNC: 293 MG/DL
HBA1C MFR BLD HPLC: 13.6 %
HCT VFR BLD CALC: 31.5 %
HDLC SERPL-MCNC: 63 MG/DL
HGB BLD-MCNC: 10.3 G/DL
IMM GRANULOCYTES NFR BLD AUTO: 0.4 %
LDLC SERPL CALC-MCNC: 96 MG/DL
LYMPHOCYTES # BLD AUTO: 1.31 K/UL
LYMPHOCYTES NFR BLD AUTO: 23.1 %
MAN DIFF?: NORMAL
MCHC RBC-ENTMCNC: 30.4 PG
MCHC RBC-ENTMCNC: 32.7 GM/DL
MCV RBC AUTO: 92.9 FL
MONOCYTES # BLD AUTO: 0.49 K/UL
MONOCYTES NFR BLD AUTO: 8.6 %
NEUTROPHILS # BLD AUTO: 3.8 K/UL
NEUTROPHILS NFR BLD AUTO: 66.8 %
NONHDLC SERPL-MCNC: 114 MG/DL
NT-PROBNP SERPL-MCNC: 20 PG/ML
PLATELET # BLD AUTO: 212 K/UL
POTASSIUM SERPL-SCNC: 4.4 MMOL/L
PROT SERPL-MCNC: 7.8 G/DL
RBC # BLD: 3.39 M/UL
RBC # FLD: 13.3 %
SODIUM SERPL-SCNC: 135 MMOL/L
TRIGL SERPL-MCNC: 88 MG/DL
WBC # FLD AUTO: 5.68 K/UL

## 2022-07-19 ENCOUNTER — LABORATORY RESULT (OUTPATIENT)
Age: 57
End: 2022-07-19

## 2022-08-16 ENCOUNTER — APPOINTMENT (OUTPATIENT)
Dept: HEART AND VASCULAR | Facility: CLINIC | Age: 57
End: 2022-08-16

## 2022-08-16 PROCEDURE — 93306 TTE W/DOPPLER COMPLETE: CPT

## 2022-09-19 ENCOUNTER — APPOINTMENT (OUTPATIENT)
Dept: ENDOCRINOLOGY | Facility: CLINIC | Age: 57
End: 2022-09-19

## 2022-09-19 VITALS
DIASTOLIC BLOOD PRESSURE: 103 MMHG | WEIGHT: 112 LBS | BODY MASS INDEX: 21.16 KG/M2 | HEART RATE: 125 BPM | SYSTOLIC BLOOD PRESSURE: 152 MMHG

## 2022-09-19 LAB
GLUCOSE BLDC GLUCOMTR-MCNC: 108
HBA1C MFR BLD HPLC: 7.8

## 2022-09-19 PROCEDURE — 99205 OFFICE O/P NEW HI 60 MIN: CPT | Mod: 25

## 2022-09-19 PROCEDURE — 82962 GLUCOSE BLOOD TEST: CPT

## 2022-09-19 PROCEDURE — G2212 PROLONG OUTPT/OFFICE VIS: CPT

## 2022-09-19 PROCEDURE — 83036 HEMOGLOBIN GLYCOSYLATED A1C: CPT | Mod: QW

## 2022-12-20 ENCOUNTER — APPOINTMENT (OUTPATIENT)
Dept: ENDOCRINOLOGY | Facility: CLINIC | Age: 57
End: 2022-12-20

## 2022-12-20 VITALS
HEART RATE: 130 BPM | SYSTOLIC BLOOD PRESSURE: 206 MMHG | WEIGHT: 114 LBS | DIASTOLIC BLOOD PRESSURE: 132 MMHG | BODY MASS INDEX: 21.54 KG/M2

## 2022-12-20 VITALS — DIASTOLIC BLOOD PRESSURE: 109 MMHG | SYSTOLIC BLOOD PRESSURE: 194 MMHG

## 2022-12-20 DIAGNOSIS — E11.65 TYPE 2 DIABETES MELLITUS WITH HYPERGLYCEMIA: ICD-10-CM

## 2022-12-20 LAB
GLUCOSE BLDC GLUCOMTR-MCNC: 146
HBA1C MFR BLD HPLC: 6.3

## 2022-12-20 PROCEDURE — 99215 OFFICE O/P EST HI 40 MIN: CPT | Mod: 25

## 2022-12-20 PROCEDURE — 83036 HEMOGLOBIN GLYCOSYLATED A1C: CPT | Mod: QW

## 2022-12-20 PROCEDURE — 82962 GLUCOSE BLOOD TEST: CPT

## 2022-12-20 RX ORDER — INSULIN DEGLUDEC INJECTION 100 U/ML
100 INJECTION, SOLUTION SUBCUTANEOUS
Qty: 2 | Refills: 1 | Status: COMPLETED | COMMUNITY
Start: 2022-09-19 | End: 2022-12-20

## 2022-12-21 LAB
C PEPTIDE SERPL-MCNC: 2.8 NG/ML
FRUCTOSAMINE SERPL-MCNC: 366 UMOL/L
GLUCOSE SERPL-MCNC: 171 MG/DL

## 2023-01-03 RX ORDER — FLASH GLUCOSE SENSOR
KIT MISCELLANEOUS
Qty: 6 | Refills: 3 | Status: ACTIVE | COMMUNITY
Start: 2022-09-19 | End: 1900-01-01

## 2023-01-05 RX ORDER — SEMAGLUTIDE 1.34 MG/ML
2 INJECTION, SOLUTION SUBCUTANEOUS
Qty: 3 | Refills: 1 | Status: ACTIVE | COMMUNITY
Start: 1900-01-01 | End: 1900-01-01

## 2023-01-20 ENCOUNTER — OFFICE (OUTPATIENT)
Dept: URBAN - METROPOLITAN AREA CLINIC 121 | Facility: CLINIC | Age: 58
Setting detail: OPHTHALMOLOGY
End: 2023-01-20
Payer: MEDICARE

## 2023-01-20 DIAGNOSIS — E11.9: ICD-10-CM

## 2023-01-20 DIAGNOSIS — H25.12: ICD-10-CM

## 2023-01-20 DIAGNOSIS — H25.13: ICD-10-CM

## 2023-01-20 PROCEDURE — 92136 OPHTHALMIC BIOMETRY: CPT | Performed by: OPHTHALMOLOGY

## 2023-01-20 PROCEDURE — 99213 OFFICE O/P EST LOW 20 MIN: CPT | Performed by: OPHTHALMOLOGY

## 2023-01-20 PROCEDURE — 92134 CPTRZ OPH DX IMG PST SGM RTA: CPT | Performed by: OPHTHALMOLOGY

## 2023-01-20 ASSESSMENT — KERATOMETRY
OD_K2POWER_DIOPTERS: 43.00
OD_CYLAXISANGLE_DEGREES: 88
OD_CYLPOWER_DEGREES: 1
OS_K1POWER_DIOPTERS: 42.25
OD_K1POWER_DIOPTERS: 42.00
OD_AXISANGLE2_DEGREES: 088
OS_K2POWER_DIOPTERS: 42.50
OD_K1POWER_DIOPTERS: 42.00
OD_K1K2_AVERAGE: 42.5
OS_AXISANGLE_DEGREES: 082
METHOD_AUTO_MANUAL: AUTO
OD_K2POWER_DIOPTERS: 43.00
OS_K2POWER_DIOPTERS: 42.50
OD_AXISANGLE_DEGREES: 088
OS_CYLAXISANGLE_DEGREES: 082
OS_CYLPOWER_DEGREES: 0.25
OS_K1K2_AVERAGE: 42.375
OS_AXISANGLE_DEGREES: 082
OS_K1POWER_DIOPTERS: 42.25
OS_AXISANGLE2_DEGREES: 082
OD_AXISANGLE_DEGREES: 088

## 2023-01-20 ASSESSMENT — SPHEQUIV_DERIVED
OS_SPHEQUIV: -8.875
OD_SPHEQUIV: -7.625
OD_SPHEQUIV: -4.875
OD_SPHEQUIV: -10.625
OS_SPHEQUIV: -5.625

## 2023-01-20 ASSESSMENT — REFRACTION_MANIFEST
OD_VA1: 20/400
OD_CYLINDER: +1.75
OS_AXIS: 179
OD_CYLINDER: +0.75
OD_SPHERE: -8.50
OD_AXIS: 077
OD_SPHERE: -5.25
OS_SPHERE: -6.75
OD_VA1: 20/100-1
OS_VA1: 20/100
OS_CYLINDER: +2.25
OS_VA1: 20/200
OD_AXIS: 85

## 2023-01-20 ASSESSMENT — REFRACTION_AUTOREFRACTION
OS_SPHERE: -9.75
OS_AXIS: 023
OD_CYLINDER: +1.25
OS_CYLINDER: +1.75
OD_SPHERE: -11.25
OD_AXIS: 063

## 2023-01-20 ASSESSMENT — PACHYMETRY
OS_CT_CORRECTION: -2
OD_CT_CORRECTION: -1
OD_CT_UM: 569
OS_CT_UM: 579

## 2023-01-20 ASSESSMENT — VISUAL ACUITY
OS_BCVA: 20/400
OD_BCVA: 2/200

## 2023-01-20 ASSESSMENT — TONOMETRY
OS_IOP_MMHG: 14
OD_IOP_MMHG: 14

## 2023-01-20 ASSESSMENT — CONFRONTATIONAL VISUAL FIELD TEST (CVF)
OS_FINDINGS: FULL
OD_FINDINGS: FULL

## 2023-01-20 ASSESSMENT — AXIALLENGTH_DERIVED
OD_AL: 26.0995
OD_AL: 29.16
OD_AL: 27.4801
OS_AL: 28.2227
OS_AL: 26.5199

## 2023-04-10 ENCOUNTER — APPOINTMENT (OUTPATIENT)
Dept: ENDOCRINOLOGY | Facility: CLINIC | Age: 58
End: 2023-04-10

## 2023-04-25 ENCOUNTER — AMBULATORY SURGERY CENTER (OUTPATIENT)
Dept: URBAN - METROPOLITAN AREA SURGERY 6 | Facility: SURGERY | Age: 58
Setting detail: OPHTHALMOLOGY
End: 2023-04-25
Payer: MEDICARE

## 2023-04-25 DIAGNOSIS — H25.12: ICD-10-CM

## 2023-04-25 DIAGNOSIS — H57.03: ICD-10-CM

## 2023-04-25 PROCEDURE — 66982 XCAPSL CTRC RMVL CPLX WO ECP: CPT | Performed by: OPHTHALMOLOGY

## 2023-04-26 ENCOUNTER — RX ONLY (RX ONLY)
Age: 58
End: 2023-04-26

## 2023-04-28 ENCOUNTER — OFFICE (OUTPATIENT)
Dept: URBAN - METROPOLITAN AREA CLINIC 121 | Facility: CLINIC | Age: 58
Setting detail: OPHTHALMOLOGY
End: 2023-04-28
Payer: MEDICARE

## 2023-04-28 DIAGNOSIS — Z96.1: ICD-10-CM

## 2023-04-28 PROBLEM — H25.11 CATARACT SENILE NUCLEAR SCLEROSIS;  , RIGHT EYE: Status: ACTIVE | Noted: 2023-04-28

## 2023-04-28 PROCEDURE — 99024 POSTOP FOLLOW-UP VISIT: CPT | Performed by: OPHTHALMOLOGY

## 2023-04-28 ASSESSMENT — REFRACTION_AUTOREFRACTION
OS_CYLINDER: +1.75
OD_SPHERE: -11.25
OS_AXIS: 023
OD_AXIS: 063
OD_CYLINDER: +1.25
OS_SPHERE: -9.75

## 2023-04-28 ASSESSMENT — KERATOMETRY
METHOD_AUTO_MANUAL: AUTO
OD_K2POWER_DIOPTERS: 43.00
OS_AXISANGLE_DEGREES: 082
OD_AXISANGLE_DEGREES: 088
OS_K1POWER_DIOPTERS: 42.25
OD_K1POWER_DIOPTERS: 42.00
OS_K2POWER_DIOPTERS: 42.50

## 2023-04-28 ASSESSMENT — REFRACTION_MANIFEST
OS_VA1: 20/200
OS_AXIS: 179
OS_CYLINDER: +2.25
OD_SPHERE: -5.25
OD_VA1: 20/100-1
OS_SPHERE: -6.75
OD_AXIS: 077
OS_VA1: 20/100
OD_AXIS: 85
OD_SPHERE: -8.50
OD_CYLINDER: +1.75
OD_CYLINDER: +0.75
OD_VA1: 20/400

## 2023-04-28 ASSESSMENT — AXIALLENGTH_DERIVED
OD_AL: 27.4801
OS_AL: 26.5199
OD_AL: 29.16
OS_AL: 28.2227
OD_AL: 26.0995

## 2023-04-28 ASSESSMENT — SPHEQUIV_DERIVED
OS_SPHEQUIV: -8.875
OD_SPHEQUIV: -4.875
OD_SPHEQUIV: -7.625
OS_SPHEQUIV: -5.625
OD_SPHEQUIV: -10.625

## 2023-04-28 ASSESSMENT — VISUAL ACUITY: OS_BCVA: 20/400

## 2023-04-28 ASSESSMENT — CONFRONTATIONAL VISUAL FIELD TEST (CVF)
OD_FINDINGS: FULL
OS_FINDINGS: FULL

## 2023-05-12 ENCOUNTER — OFFICE (OUTPATIENT)
Dept: URBAN - METROPOLITAN AREA CLINIC 121 | Facility: CLINIC | Age: 58
Setting detail: OPHTHALMOLOGY
End: 2023-05-12
Payer: MEDICARE

## 2023-05-12 DIAGNOSIS — H25.11: ICD-10-CM

## 2023-05-12 PROCEDURE — 92286 ANT SGM IMG I&R SPECLR MIC: CPT | Performed by: OPHTHALMOLOGY

## 2023-05-12 PROCEDURE — 92136 OPHTHALMIC BIOMETRY: CPT | Performed by: OPHTHALMOLOGY

## 2023-05-12 ASSESSMENT — KERATOMETRY
OD_CYLAXISANGLE_DEGREES: 118
OS_AXISANGLE_DEGREES: 072
OS_K1POWER_DIOPTERS: 41.75
OS_K2POWER_DIOPTERS: 42.75
OD_K2POWER_DIOPTERS: 42.25
OD_K1POWER_DIOPTERS: 41.75
OS_AXISANGLE_DEGREES: 162
OD_AXISANGLE2_DEGREES: 118
OD_K2POWER_DIOPTERS: 42.25
OS_AXISANGLE2_DEGREES: 072
OS_K1K2_AVERAGE: 42.25
OS_K1POWER_DIOPTERS: 41.75
OD_K1K2_AVERAGE: 42
METHOD_AUTO_MANUAL: AUTO
OD_CYLPOWER_DEGREES: 0.5
OS_K2POWER_DIOPTERS: 42.75
OS_CYLAXISANGLE_DEGREES: 072
OD_K1POWER_DIOPTERS: 41.75
OS_CYLPOWER_DEGREES: 1
OD_AXISANGLE_DEGREES: 118
OD_AXISANGLE_DEGREES: 28

## 2023-05-12 ASSESSMENT — REFRACTION_MANIFEST
OD_VA1: 20/400
OS_VA1: 20/100
OD_CYLINDER: +1.75
OD_CYLINDER: +0.75
OD_SPHERE: -8.50
OD_AXIS: 85
OD_AXIS: 077
OS_AXIS: 179
OS_SPHERE: -6.75
OD_VA1: 20/100-1
OS_VA1: 20/200
OD_SPHERE: -5.25
OS_CYLINDER: +2.25

## 2023-05-12 ASSESSMENT — SPHEQUIV_DERIVED
OS_SPHEQUIV: -5.625
OS_SPHEQUIV: -0.75
OD_SPHEQUIV: -13
OD_SPHEQUIV: -7.625
OD_SPHEQUIV: -4.875

## 2023-05-12 ASSESSMENT — CONFRONTATIONAL VISUAL FIELD TEST (CVF)
OS_FINDINGS: FULL
OD_FINDINGS: FULL

## 2023-05-12 ASSESSMENT — PACHYMETRY
OD_CT_UM: 569
OS_CT_UM: 579
OS_CT_CORRECTION: -2
OD_CT_CORRECTION: -1

## 2023-05-12 ASSESSMENT — REFRACTION_AUTOREFRACTION
OS_AXIS: 014
OS_CYLINDER: +0.50
OD_SPHERE: -13.75
OD_AXIS: 079
OS_SPHERE: -1.00
OD_CYLINDER: +1.50

## 2023-05-12 ASSESSMENT — TONOMETRY
OS_IOP_MMHG: 18
OD_IOP_MMHG: 18

## 2023-05-12 ASSESSMENT — VISUAL ACUITY
OS_BCVA: 20/400
OD_BCVA: 20/40

## 2023-05-12 ASSESSMENT — AXIALLENGTH_DERIVED
OS_AL: 26.5781
OD_AL: 30.96
OD_AL: 27.7315
OS_AL: 24.368
OD_AL: 26.3262

## 2023-09-25 RX ORDER — PIOGLITAZONE HYDROCHLORIDE 30 MG/1
30 TABLET ORAL
Qty: 90 | Refills: 0 | Status: ACTIVE | COMMUNITY
Start: 1900-01-01 | End: 1900-01-01

## 2023-12-08 ENCOUNTER — OFFICE (OUTPATIENT)
Dept: URBAN - METROPOLITAN AREA CLINIC 121 | Facility: CLINIC | Age: 58
Setting detail: OPHTHALMOLOGY
End: 2023-12-08
Payer: MEDICARE

## 2023-12-08 DIAGNOSIS — H25.11: ICD-10-CM

## 2023-12-08 DIAGNOSIS — H35.013: ICD-10-CM

## 2023-12-08 DIAGNOSIS — H25.21: ICD-10-CM

## 2023-12-08 DIAGNOSIS — E11.9: ICD-10-CM

## 2023-12-08 PROCEDURE — 92134 CPTRZ OPH DX IMG PST SGM RTA: CPT | Performed by: OPHTHALMOLOGY

## 2023-12-08 PROCEDURE — 99213 OFFICE O/P EST LOW 20 MIN: CPT | Performed by: OPHTHALMOLOGY

## 2023-12-08 ASSESSMENT — CONFRONTATIONAL VISUAL FIELD TEST (CVF)
OD_FINDINGS: FULL
OS_FINDINGS: FULL

## 2023-12-19 ASSESSMENT — SPHEQUIV_DERIVED
OD_SPHEQUIV: -13
OS_SPHEQUIV: -5.625
OD_SPHEQUIV: -7.625
OD_SPHEQUIV: -4.875
OS_SPHEQUIV: -0.75

## 2023-12-19 ASSESSMENT — REFRACTION_AUTOREFRACTION
OS_CYLINDER: +0.50
OD_CYLINDER: +1.50
OS_AXIS: 014
OD_AXIS: 079
OD_SPHERE: -13.75
OS_SPHERE: -1.00

## 2023-12-19 ASSESSMENT — REFRACTION_MANIFEST
OD_SPHERE: -5.25
OD_VA1: 20/100-1
OD_CYLINDER: +0.75
OD_AXIS: 077
OS_AXIS: 179
OS_SPHERE: -6.75
OS_VA1: 20/100
OS_CYLINDER: +2.25
OD_AXIS: 85
OD_CYLINDER: +1.75
OD_SPHERE: -8.50

## 2024-08-09 ENCOUNTER — OFFICE (OUTPATIENT)
Dept: URBAN - METROPOLITAN AREA CLINIC 121 | Facility: CLINIC | Age: 59
Setting detail: OPHTHALMOLOGY
End: 2024-08-09
Payer: MEDICARE

## 2024-08-09 DIAGNOSIS — H25.11: ICD-10-CM

## 2024-08-09 DIAGNOSIS — H25.13: ICD-10-CM

## 2024-08-09 PROCEDURE — 92136 OPHTHALMIC BIOMETRY: CPT | Mod: TC | Performed by: OPHTHALMOLOGY

## 2024-08-09 PROCEDURE — 99213 OFFICE O/P EST LOW 20 MIN: CPT | Performed by: OPHTHALMOLOGY

## 2024-08-09 PROCEDURE — 92136 OPHTHALMIC BIOMETRY: CPT | Mod: RT | Performed by: OPHTHALMOLOGY

## 2024-08-09 ASSESSMENT — CONFRONTATIONAL VISUAL FIELD TEST (CVF)
OD_FINDINGS: FULL
OS_FINDINGS: FULL

## 2024-10-22 ENCOUNTER — AMBULATORY SURGERY CENTER (OUTPATIENT)
Dept: URBAN - METROPOLITAN AREA SURGERY 6 | Facility: SURGERY | Age: 59
Setting detail: OPHTHALMOLOGY
End: 2024-10-22
Payer: MEDICARE

## 2024-10-22 DIAGNOSIS — H57.03: ICD-10-CM

## 2024-10-22 DIAGNOSIS — H25.11: ICD-10-CM

## 2024-10-22 PROCEDURE — 66982 XCAPSL CTRC RMVL CPLX WO ECP: CPT | Mod: RT | Performed by: OPHTHALMOLOGY

## 2024-10-23 ENCOUNTER — RX ONLY (RX ONLY)
Age: 59
End: 2024-10-23

## 2024-10-23 ENCOUNTER — OFFICE (OUTPATIENT)
Facility: LOCATION | Age: 59
Setting detail: OPHTHALMOLOGY
End: 2024-10-23
Payer: MEDICARE

## 2024-10-23 DIAGNOSIS — Z96.1: ICD-10-CM

## 2024-10-23 PROCEDURE — 99024 POSTOP FOLLOW-UP VISIT: CPT | Performed by: OPHTHALMOLOGY

## 2024-10-23 ASSESSMENT — REFRACTION_AUTOREFRACTION
OD_CYLINDER: +0.50
OS_AXIS: 056
OD_SPHERE: -12.50
OD_AXIS: 156
OS_SPHERE: -1.25
OS_CYLINDER: +0.25

## 2024-10-23 ASSESSMENT — VISUAL ACUITY
OS_BCVA: 20/ 30
OD_BCVA: 20/25

## 2024-10-23 ASSESSMENT — KERATOMETRY
OD_K1POWER_DIOPTERS: 42.00
OS_AXISANGLE_DEGREES: 173
OD_AXISANGLE_DEGREES: 085
OS_K1POWER_DIOPTERS: 42.25
METHOD_AUTO_MANUAL: AUTO
OD_K2POWER_DIOPTERS: 42.50
OS_K2POWER_DIOPTERS: 42.50

## 2024-10-23 ASSESSMENT — REFRACTION_MANIFEST
OD_AXIS: 85
OD_SPHERE: -8.50
OD_SPHERE: -5.25
OS_SPHERE: -6.75
OS_VA1: 20/100
OD_AXIS: 077
OS_AXIS: 179
OD_CYLINDER: +0.75
OD_CYLINDER: +1.75
OS_CYLINDER: +2.25
OD_VA1: 20/100-1

## 2024-10-23 ASSESSMENT — CONFRONTATIONAL VISUAL FIELD TEST (CVF)
OD_FINDINGS: FULL
OS_FINDINGS: FULL

## 2024-10-23 ASSESSMENT — TONOMETRY: OD_IOP_MMHG: 14

## 2024-10-23 ASSESSMENT — PACHYMETRY
OS_CT_UM: 579
OD_CT_CORRECTION: -1
OS_CT_CORRECTION: -2
OD_CT_UM: 569

## 2024-11-05 ENCOUNTER — OFFICE (OUTPATIENT)
Facility: LOCATION | Age: 59
Setting detail: OPHTHALMOLOGY
End: 2024-11-05
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
## 2024-11-05 PROCEDURE — NO SHOW FE NO SHOW FEE: Performed by: OPHTHALMOLOGY
